# Patient Record
Sex: MALE | Race: WHITE | NOT HISPANIC OR LATINO | Employment: OTHER | URBAN - METROPOLITAN AREA
[De-identification: names, ages, dates, MRNs, and addresses within clinical notes are randomized per-mention and may not be internally consistent; named-entity substitution may affect disease eponyms.]

---

## 2017-04-21 ENCOUNTER — HISTORICAL (OUTPATIENT)
Dept: CARDIOLOGY | Facility: HOSPITAL | Age: 57
End: 2017-04-21

## 2017-05-16 ENCOUNTER — HISTORICAL (OUTPATIENT)
Dept: ADMINISTRATIVE | Facility: HOSPITAL | Age: 57
End: 2017-05-16

## 2019-09-27 ENCOUNTER — HISTORICAL (OUTPATIENT)
Dept: ADMINISTRATIVE | Facility: HOSPITAL | Age: 59
End: 2019-09-27

## 2019-12-02 ENCOUNTER — HISTORICAL (OUTPATIENT)
Dept: ADMINISTRATIVE | Facility: HOSPITAL | Age: 59
End: 2019-12-02

## 2020-02-11 ENCOUNTER — HISTORICAL (OUTPATIENT)
Dept: ADMINISTRATIVE | Facility: HOSPITAL | Age: 60
End: 2020-02-11

## 2020-05-26 ENCOUNTER — HISTORICAL (OUTPATIENT)
Dept: ADMINISTRATIVE | Facility: HOSPITAL | Age: 60
End: 2020-05-26

## 2020-12-01 ENCOUNTER — HISTORICAL (OUTPATIENT)
Dept: RADIOLOGY | Facility: HOSPITAL | Age: 60
End: 2020-12-01

## 2020-12-07 ENCOUNTER — HISTORICAL (OUTPATIENT)
Dept: ADMINISTRATIVE | Facility: HOSPITAL | Age: 60
End: 2020-12-07

## 2021-11-23 ENCOUNTER — HISTORICAL (OUTPATIENT)
Dept: ADMINISTRATIVE | Facility: HOSPITAL | Age: 61
End: 2021-11-23

## 2022-03-17 ENCOUNTER — HISTORICAL (OUTPATIENT)
Dept: ADMINISTRATIVE | Facility: HOSPITAL | Age: 62
End: 2022-03-17

## 2022-03-17 LAB
ANION GAP SERPL CALC-SCNC: 17 MMOL/L
BUN SERPL-MCNC: 26 MG/DL (ref 8–26)
CHLORIDE SERPL-SCNC: 103 MMOL/L (ref 98–109)
CREAT SERPL-MCNC: 1.1 MG/DL (ref 0.6–1.3)
GLUCOSE SERPL-MCNC: 96 MG/DL (ref 70–105)
HCT VFR BLD CALC: 48 % (ref 38–51)
HGB BLD-MCNC: 16.3 G/DL (ref 12–17)
POC IONIZED CALCIUM: 1.31 (ref 1.12–1.32)
POC TCO2: 28 (ref 24–29)
POTASSIUM BLD-SCNC: 4.4 MMOL/L (ref 3.5–4.9)
SODIUM BLD-SCNC: 142 MMOL/L (ref 138–146)

## 2022-03-28 ENCOUNTER — HISTORICAL (OUTPATIENT)
Dept: ADMINISTRATIVE | Facility: HOSPITAL | Age: 62
End: 2022-03-28

## 2022-04-10 ENCOUNTER — HISTORICAL (OUTPATIENT)
Dept: ADMINISTRATIVE | Facility: HOSPITAL | Age: 62
End: 2022-04-10

## 2022-04-25 ENCOUNTER — HISTORICAL (OUTPATIENT)
Dept: ADMINISTRATIVE | Facility: HOSPITAL | Age: 62
End: 2022-04-25

## 2022-04-29 VITALS
DIASTOLIC BLOOD PRESSURE: 71 MMHG | WEIGHT: 179.69 LBS | HEIGHT: 70 IN | BODY MASS INDEX: 25.73 KG/M2 | SYSTOLIC BLOOD PRESSURE: 114 MMHG

## 2022-05-19 DIAGNOSIS — M51.34 DEGENERATION OF THORACIC INTERVERTEBRAL DISC: Primary | ICD-10-CM

## 2022-05-25 ENCOUNTER — TELEPHONE (OUTPATIENT)
Dept: ADMINISTRATIVE | Facility: HOSPITAL | Age: 62
End: 2022-05-25
Payer: OTHER GOVERNMENT

## 2022-12-06 LAB — CRC RECOMMENDATION EXT: NORMAL

## 2022-12-21 ENCOUNTER — TELEPHONE (OUTPATIENT)
Dept: NEUROSURGERY | Facility: CLINIC | Age: 62
End: 2022-12-21
Payer: OTHER GOVERNMENT

## 2022-12-22 DIAGNOSIS — M54.9 MID BACK PAIN: ICD-10-CM

## 2022-12-22 DIAGNOSIS — M54.50 LUMBAR PAIN: Primary | ICD-10-CM

## 2022-12-22 NOTE — TELEPHONE ENCOUNTER
I spoke to the patient and he only wants to see Dr. Rivas.  I scheduled him for next available on 4/3/23 with thoracic and lumbar XR, but he is not happy about the wait.  He was treating with Dr. Decker (records are in media.)  At the last visit Dr. Pérez mentioned doing another epidural injection at the Thoracolumbar area, but it was denied with insurance.  The patient is stating that Dr. Pérez is saying he is experiencing protective muscle spasms or contractions at his T10-T12 level.  He is wanting to know if you know anything about this condition and if you can help him before he has to wait months to see you.  If not he would like a recommendation on who he could possibly see and he does not want to do more physical therapy, but it makes the pain worse.  I will call Dr. Farrell's office to get more records and send to Dr. Rivas for review.

## 2022-12-22 NOTE — TELEPHONE ENCOUNTER
Dr. Farrell's records and injection notes are in media.  Please let me know if you are familiar with protective muscle spasms or contractions and if you can help him or if you have a recommendation or a suggestion on someone else he should see.  More detailed message is below...BH

## 2023-01-10 DIAGNOSIS — M51.34 DEGENERATIVE DISC DISEASE, THORACIC: Primary | ICD-10-CM

## 2023-01-10 NOTE — TELEPHONE ENCOUNTER
I spoke to the patient.  He asked me to email Dr. Rivas's recommendation below and he would let me know how he would like to proceed.  I cancelled his appointment in April with Dr. Rivas...BH

## 2023-02-07 ENCOUNTER — TELEPHONE (OUTPATIENT)
Dept: NEUROSURGERY | Facility: CLINIC | Age: 63
End: 2023-02-07
Payer: OTHER GOVERNMENT

## 2023-02-08 DIAGNOSIS — M54.50 LUMBAR PAIN: ICD-10-CM

## 2023-02-08 DIAGNOSIS — M51.34 DEGENERATIVE DISC DISEASE, THORACIC: Primary | ICD-10-CM

## 2023-02-23 NOTE — PROGRESS NOTES
Subjective:      Patient ID: Jaxson Ferrell is a 62 y.o. male.    Chief Complaint: Back Pain (Referred by  Apply had lumbar fusion in 2019,  had injections past which did not get much relief would like to try another injection, treated with Dr Farrell, pain level 8/10)    Referred by: Ephraim Rivas MD     HPI:  Patient presents as a new consult for pain related to degenerative disc disease, thoracic and lumbar region.  On 03/24/2019 patient underwent removal of L5-S1 instrumentation with L4-5 and L5-S1 a TLIF with pedicle screws and post anterior lateral bone.  He had stabilozation with his symptoms after surgery.  He returned to Dr. Rivas at the end of 2021 due to increased back pain and notable right leg weakness.  Dr. Gar performed  original lumbar surgery. He currently takes Gabapentin 800 mg TID, Baclofen 10 mg BID     Primary pain located to right low thoracic  region of his back with sread on occasion to left side . States pain feels like a TENS unit when it contractes. Pain to this area wakes him up with spasms. He is unable to perform core exercises. Pain intense with walking, sweeping, mopping, sitting too long and standing too long. Pain raidates to bilateral low back, bilateral buttocks, bilateral thighs to lateral aspect to bilateral ankles and bilateral feet numb on bottom with intermittent numbness to toes. Pressing pain is located to posterior mid back (dermatome about 2 in below nipple line from anterior chest  as reference).     Review of Thoracic MRI notes Degenerative changes:Small multilevel disc bulges.No significant canal narrowing.Moderate right foraminal narrowing at T11-12, mild on the left at T2-3 and from T10 through T12 related to facet hypertrophy.IMPRESSION: shows no significant canal stenosis. Vary degrees of neuro foraminal stenosis as detailed, moderate on the right at T11-T12. No disc herniations     Pain score 8/10. Daily pain 7/10-10/10. Feels like sharp stabbing  pain. He also has weakness to bilateral legs.     PMH:  Lumbar radiculitis, Lumbar spondylosis,Spinal stenosis, osteoarthritis, kidney stones, lumbar neuritis/radiculitis, lumbar spondylosis, failed back syndrome lumbar region, cervical spondylosis without myelopathy, pelvic/thigh/hip degenerative joint disease, shoulder DJD, lumbar disc degeneration, cervical disc degeneration, patellar subluxation, chronic bilateral knee pain, ulnar neuropathy with degeneration of nerve, osteoporosis, arthrosis after fusion, bilateral lower extremity edema, spondylolisthesis L4-L5 , disc degeneration thoracic region, disc degeneration lumbar region, bilateral carpal tunnel syndrome, lumbar stenosis with neurogenic claudication, low back pain, cervical pain, cervical spondylosis with radiculopathy, vascular disease with venous insufficiency, arthritis, hypercholesterolemia, kidney stones,    PSH:  Fusion of spine, (prior fusion L4-L5 and L5-S1)  kidney stone removal, sinus surgery Prevous surgery March 2010 L5/S1 that failed.   FH: cancer: Mother and father  Social history:  Prior smoker.  Drinks alcohol.  Negative illicit drug use.  Negative heavy alcohol use.    Medications from 4/2022:  Atorvastatin calcium 40 mg daily  Baclofen 10 mg t.i.d.   Co Q10 200 mg daily   Flomax 0.4 mg daily   Gabapentin 800 mg b.i.d./t.i.d.  Vitamin D3 125 mcg area of the day  Claritin D 12 hour 5/120 mg tablet ER   Lorazepam 1 mg tablet as needed  Prolia 60 mg/mL subcu every 6 months   Tylenol 8 hour Arthritis pain 650 mg tablet b.i.d.  BU metolazone 750 mg tablet twice daily  Ciprofloxacin 500 mg tablet    Prior Interventional Pain Procedures:   10/2022: Lumbar ESIs   Lumbar MBBs stopped as no relief      MRI Thoracic Spine 2022     DISCUSSION:     Alignment: Slight exaggeration of the normal kyphosis. Gentle S-shaped  curvature with right apex at T6 and left apex at T11.  Soft tissues: No signal abnormalities  Spinal cord: Normal in signal and  morphology. Ends at T12-L1.n     Vertebrae:   No fractures, infection or neoplasm.  Right posterior T7 body 1.6 cm atypical (lipid poor) hemangioma.  Endplate marrow edema centered at T11-12 on the right is presumed  degenerative.     Degenerative changes:  Small multilevel disc bulges.  No significant canal narrowing.  Moderate right foraminal narrowing at T11-12, mild on the left at T2-3  and from T10 through T12 related to facet hypertrophy.     IMPRESSION:      1. No significant canal stenosis.     2. Varying degrees of neuroforaminal stenoses as detailed, moderate on  the right at T11-12.     3. No disc herniations.       MRI lumbar spine 03/28/2022:    Alignment:  Normal, lordosis.  No scoliosis.  Grade 1 anterior listhesis of L4 on L5.    Soft tissues:  Postsurgical changes dorsally.  Otherwise no signal abnormalities.  Posterior paraspinal muscles: Fatty atrophy.  Intramuscular edema on the left at the lumbosacral junction suggest injury/strain.  Otherwise no signal abnormalities.    Conus medullaris:  Normal ends at L1   Cauda equina:  No masses or arachnoiditis.      Vertebrae:  No fractures, infection or neoplasm.  Status post posterior fusion and decompression from L4 through S1 with bilateral stabilizing rods, trans pedicular screws, L4-5 interbody fusion device, L5-S1 disc spacer, left L4 hemilaminectomy and L5-S1 decompressive laminectomies.    Degenerative changes:     L1-L2 facet hypertrophy.  No significant canal or foraminal narrowing.      L2-L3:  Facet hypertrophy.  No significant canal or foraminal narrowing.      L3-L4: Mild canal and bilateral foraminal narrowing related to symmetric disc bulge and facet hypertrophy.      L4-L5: Postsurgical changes.    No significant canal or foraminal narrowing.      L5-S1:  Postsurgical changes   No significant canal or foraminal narrowing.      Incidental findings: Right renal lower pole nonobstructive stone.    Small bilateral renal cysts.       IMPRESSION:   Postsurgical spine   Mild degenerative canal and bilateral neuroforaminal stenosis at L3-4.    No disc herniation  The study does not show cord compression .  Facet hypertrophy at T11-12, T12-L1.  No foraminal stenosis at L4-5 or L5-S1    Cervical MRI spine:  2022:  Cervical spondylosis with stenosis at C3-4 greater than C4-5.  This is progressed as compared to cervical MRI from 2018      Interventional Pain History  08/16/2022:  TFESI lumbar/sacroiliac single level x2  10/2022: Bilateral lumbar medial branch blocks (di not work)       ROS low back pain    Pertinent labs 2019 (outdated):   Hematocrit low 28.7   Hemoglobin low 9.1  platelets low 118                   Objective:          Physical Exam  General: Well developed; overweight; A&O x 3; No anxiety/depression; NAD  Mental Status: Oriented to person, palce and time. Displays appropriate mood & affect.  Head: Norm cephalic and atraumatic  Neck: Midline trachea  Eyes: normal conjunctiva, normal lids, normal pupils  ENT and mouth: normal external ear, nose, and no lesions noted on the lips.  Respiratory: Symmetrical, Unlabored. No dyspnea  CV: normal  S1/S2, normal rhythm and rate. No peripheral edema.   Abdomen: Non-distended    Extremities:  Gen: No cyanosis or tenderness to palpation bilateral upper and lower extremities  Skin: Warm, pink, dry, no rashes, no lesions on the lumbar spine  Strength: 4/5 motor strength bilateral upper and lower  ROM: Full ROM in bilateral knees and ankles without pain or instability.    Neuro:  Gait: Guarded gait. Indendant ambulator. no altalgic lean, normal toe and heel raise  DTR's: 2+ in bilateral patellar, and ankle  Sensory:  bilteral arm and hand numbness R>L  Bilateal feet numb.   Thoric and Lumbar Spine: Normal lordosis. No scoliosis  L-spine and T spine ROM: Painful with , extension, bilateral rotation, Normal flexion  Straight Leg Raise: + bilaterally (guarded)  SI Joint: + tenderness bilateral               Assessment:       Encounter Diagnoses   Name Primary?    Degenerative disc disease, thoracic Yes    Lumbar pain     Lumbar spondylosis     Bulge of lumbar disc without myelopathy     Bulge of thoracic disc without myelopathy          Plan:       Jaxson was seen today for back pain.    Diagnoses and all orders for this visit:    Degenerative disc disease, thoracic  -     Ambulatory referral/consult to Pain Clinic    Lumbar pain  -     Ambulatory referral/consult to Pain Clinic    Lumbar spondylosis    Bulge of lumbar disc without myelopathy    Bulge of thoracic disc without myelopathy     Request sent to Dr. Ly for current CMP, CBC with diff labs  I am going to request review of MRI T spine with Dr. Buckley for her opinion regarding dermatome  level and confirmation of disc bulges in imaging.   Prior schedule T11/12  recommendation per Dr Dash.   Confirm if this T spine pain is from facet joints vs disc bulge with Dr. Buckley.     I will call patient back with plan of care once I receive feedback from Dr. Buckley.     Dr. Buckley will see him in a follow up visit and discuss his plan of care. He is agreeable with this plan.         Past Medical History:   Diagnosis Date    Arthritis     High cholesterol     Kidney stones     Osteoporosis     Personal history of colonic polyps     Spinal stenosis     thoracic    Urinary retention        Past Surgical History:   Procedure Laterality Date    COLONOSCOPY N/A 12/06/2022    [facility]arg[facility]se Padilla    CYSTOSCOPY W/ LASER LITHOTRIPSY      EPIDURAL STEROID INJECTION INTO THORACIC SPINE N/A 03/20/2023    Procedure: INJECTION, STEROID, SPINE, THORACIC, EPIDURAL T10-11 interlaminar;  Surgeon: Autumn Buckley MD;  Location: [facility] OR;  Service: Pain Management;  Laterality: N/A;  T10-11 interlaminar    spinal injections      SPINE SURGERY      fusion 2 levels with rods and screws    TONSILLECTOMY      turbinate reduction         Family History   Problem Relation Age  of Onset    Breast cancer Mother     Coronary artery disease Father     Colon cancer Father     Prostate cancer Father        Social History     Socioeconomic History    Marital status: Single   Tobacco Use    Smoking status: Former     Types: Cigarettes     Quit date: 3/2/2010     Years since quittin.2    Smokeless tobacco: Never   Substance and Sexual Activity    Alcohol use: Yes     Alcohol/week: 1.0 standard drink     Types: 1 Glasses of wine per week     Comment: WINE IN EVENING    Drug use: Never    Sexual activity: Yes     Partners: Female     Social Determinants of Health     Financial Resource Strain: Low Risk     Difficulty of Paying Living Expenses: Not hard at all   Food Insecurity: No Food Insecurity    Worried About Running Out of Food in the Last Year: Never true    Ran Out of Food in the Last Year: Never true   Transportation Needs: No Transportation Needs    Lack of Transportation (Medical): No    Lack of Transportation (Non-Medical): No   Physical Activity: Sufficiently Active    Days of Exercise per Week: 5 days    Minutes of Exercise per Session: 40 min   Stress: No Stress Concern Present    Feeling of Stress : Not at all   Social Connections: Unknown    Frequency of Communication with Friends and Family: More than three times a week    Frequency of Social Gatherings with Friends and Family: More than three times a week    Active Member of Clubs or Organizations: Yes    Attends Club or Organization Meetings: More than 4 times per year    Marital Status:    Housing Stability: Unknown    Unable to Pay for Housing in the Last Year: No    Unstable Housing in the Last Year: No       Current Outpatient Medications   Medication Sig Dispense Refill    acetaminophen (TYLENOL) 650 MG TbSR Take 650 mg by mouth 2 (two) times daily.      atorvastatin (LIPITOR) 40 MG tablet Take 40 mg by mouth.      baclofen (LIORESAL) 10 MG tablet Take 10 mg by mouth 2 (two) times daily.      co-enzyme Q-10 30 mg  capsule Take 30 mg by mouth 3 (three) times daily.      gabapentin (NEURONTIN) 800 MG tablet Take 800 mg by mouth 3 (three) times daily.      PROLIA 60 mg/mL Syrg Inject into the skin.      tamsulosin (FLOMAX) 0.4 mg Cap Take 1 capsule by mouth.      loratadine-pseudoephedrine  mg (CLARITIN-D 24-HOUR)  mg per 24 hr tablet Take 1 tablet by mouth once daily.      nabumetone (RELAFEN) 500 MG tablet Take 750 mg by mouth 2 (two) times daily as needed for Pain.      vitamin D (VITAMIN D3) 1000 units Tab Take 5,000 Units by mouth 3 (three) times a week.       No current facility-administered medications for this visit.       Review of patient's allergies indicates:  No Known Allergies

## 2023-02-24 ENCOUNTER — OFFICE VISIT (OUTPATIENT)
Dept: PAIN MEDICINE | Facility: CLINIC | Age: 63
End: 2023-02-24
Payer: OTHER GOVERNMENT

## 2023-02-24 VITALS
DIASTOLIC BLOOD PRESSURE: 83 MMHG | HEART RATE: 80 BPM | WEIGHT: 189 LBS | SYSTOLIC BLOOD PRESSURE: 121 MMHG | BODY MASS INDEX: 27.06 KG/M2 | TEMPERATURE: 99 F | HEIGHT: 70 IN

## 2023-02-24 DIAGNOSIS — M51.34 DEGENERATIVE DISC DISEASE, THORACIC: Primary | ICD-10-CM

## 2023-02-24 DIAGNOSIS — M54.50 LUMBAR PAIN: ICD-10-CM

## 2023-02-24 DIAGNOSIS — M51.36 BULGE OF LUMBAR DISC WITHOUT MYELOPATHY: ICD-10-CM

## 2023-02-24 DIAGNOSIS — M47.816 LUMBAR SPONDYLOSIS: ICD-10-CM

## 2023-02-24 DIAGNOSIS — M51.34 BULGE OF THORACIC DISC WITHOUT MYELOPATHY: ICD-10-CM

## 2023-02-24 PROCEDURE — 99205 OFFICE O/P NEW HI 60 MIN: CPT | Mod: ,,, | Performed by: NURSE PRACTITIONER

## 2023-02-24 PROCEDURE — 99205 PR OFFICE/OUTPT VISIT, NEW, LEVL V, 60-74 MIN: ICD-10-PCS | Mod: ,,, | Performed by: NURSE PRACTITIONER

## 2023-02-24 RX ORDER — FERROUS SULFATE, DRIED 160(50) MG
1 TABLET, EXTENDED RELEASE ORAL 2 TIMES DAILY WITH MEALS
COMMUNITY
End: 2023-03-06 | Stop reason: ALTCHOICE

## 2023-02-24 RX ORDER — UBIDECARENONE 30 MG
30 CAPSULE ORAL DAILY
COMMUNITY

## 2023-02-24 RX ORDER — TAMSULOSIN HYDROCHLORIDE 0.4 MG/1
1 CAPSULE ORAL DAILY
COMMUNITY
Start: 2023-01-29

## 2023-02-24 RX ORDER — DENOSUMAB 60 MG/ML
60 INJECTION SUBCUTANEOUS
COMMUNITY
Start: 2022-12-26

## 2023-02-24 RX ORDER — GABAPENTIN 800 MG/1
800 TABLET ORAL 3 TIMES DAILY
COMMUNITY

## 2023-02-24 RX ORDER — CHROMIUM PICOLINATE 200 MCG
5000 TABLET ORAL
COMMUNITY
End: 2023-03-08

## 2023-02-24 RX ORDER — BACLOFEN 10 MG/1
10 TABLET ORAL 2 TIMES DAILY
COMMUNITY

## 2023-02-24 RX ORDER — DEXTROMETHORPHAN HYDROBROMIDE, GUAIFENESIN 5; 100 MG/5ML; MG/5ML
650 LIQUID ORAL 2 TIMES DAILY
COMMUNITY

## 2023-02-24 RX ORDER — ATORVASTATIN CALCIUM 40 MG/1
40 TABLET, FILM COATED ORAL DAILY
COMMUNITY
Start: 2023-02-12

## 2023-02-24 RX ORDER — PHENAZOPYRIDINE HYDROCHLORIDE 200 MG/1
200 TABLET, FILM COATED ORAL EVERY 8 HOURS PRN
COMMUNITY
Start: 2022-11-28 | End: 2023-03-08

## 2023-02-24 NOTE — Clinical Note
Velma, before I reach out to Dr. Buckley to review patient's MRI thoracic spine with me and confirm his plan of care, please update if patient brought imaging of his MRI thoracic spine as I do not see anything that has been scanned in .  Thanks once I received this then I will review this with Dr. Buckley then [place] back with patient.Thanks, Maricarmen

## 2023-03-02 ENCOUNTER — OFFICE VISIT (OUTPATIENT)
Dept: PAIN MEDICINE | Facility: CLINIC | Age: 63
End: 2023-03-02
Payer: OTHER GOVERNMENT

## 2023-03-02 VITALS
BODY MASS INDEX: 27.06 KG/M2 | DIASTOLIC BLOOD PRESSURE: 87 MMHG | SYSTOLIC BLOOD PRESSURE: 121 MMHG | HEIGHT: 70 IN | WEIGHT: 189 LBS | TEMPERATURE: 99 F | HEART RATE: 66 BPM

## 2023-03-02 DIAGNOSIS — M51.34 THORACIC DEGENERATIVE DISC DISEASE: ICD-10-CM

## 2023-03-02 DIAGNOSIS — M54.9 MID BACK PAIN: Primary | ICD-10-CM

## 2023-03-02 PROCEDURE — 99214 PR OFFICE/OUTPT VISIT, EST, LEVL IV, 30-39 MIN: ICD-10-PCS | Mod: ,,, | Performed by: ANESTHESIOLOGY

## 2023-03-02 PROCEDURE — 99214 OFFICE O/P EST MOD 30 MIN: CPT | Mod: ,,, | Performed by: ANESTHESIOLOGY

## 2023-03-02 NOTE — PROGRESS NOTES
Autumn Buckley MD        PATIENT NAME: Jaxson Ferrell  : 1960  DATE: 3/2/23  MRN: [id number]      Billing Provider: Autumn Buckley MD  Level of Service:   Patient PCP Information       Provider PCP Type    Anshul East MD General            Reason for Visit / Chief Complaint: Back Pain (F/u for back pain, MRI uploaded in EMR, pt states nothing has changed since his last appointment, tylenol, and prescription medications for relief, pain level 8/10)       Update PCP  Update Chief Complaint         History of Present Illness / Problem Focused Workflow     Jaxson Ferrell presents to the clinic with Back Pain (F/u for back pain, MRI uploaded in EMR, pt states nothing has changed since his last appointment, tylenol, and prescription medications for relief, pain level 8/10)     Patient presents for f/u of degenerative disc disease, thoracic and lumbar .  On 2019 patient underwent removal of L5-S1 instrumentation with L4-5 and L5-S1 a TLIF with pedicle screws and post anterior lateral bone.  He had stabilization with his symptoms after surgery.  He returned to Dr. Rivas at the end of  due to increased back pain and notable right leg weakness.  Dr. Gar performed  original lumbar surgery.  He currently takes Gabapentin 800 mg TID, Baclofen 10 mg BID      Primary pain located to right low thoracic with spread on occasion to left side . States pain feels like a TENS unit when it contracts. Pain to this area wakes him up with spasms. He is unable to perform core exercises. Pain intense with walking, sweeping, mopping, sitting too long and standing too long.   Pain raidates to bilateral low back, bilateral buttocks, bilateral thighs to lateral aspect to bilateral ankles and bilateral feet numb on bottom with intermittent numbness to toes.   Pain score 8/10. Daily pain 7/10-10/10. Feels like sharp stabbing pain. He also has weakness to bilateral legs.      Pressing pain is located to  posterior mid back (dermatome about 2 in below nipple line from anterior chest  as reference)      Back Pain  Associated symptoms include leg pain and numbness.     Review of Systems     Review of Systems   Musculoskeletal:  Positive for back pain and leg pain.   Neurological:  Positive for numbness.   All other systems reviewed and are negative.     Medical / Social / Family History     Past Medical History:   Diagnosis Date    Bilateral kidney stones     High cholesterol        Past Surgical History:   Procedure Laterality Date    COLONOSCOPY N/A     SPINE SURGERY      TONSILLECTOMY         Social History  Mr. Ferrell  reports that he has never smoked. He has never used smokeless tobacco. He reports current alcohol use of about 1.0 standard drink per week. He reports that he does not use drugs.    Family History  Mr.'s Ferrell family history is not on file.    Medications and Allergies     Medications  No outpatient medications have been marked as taking for the 3/2/23 encounter (Office Visit) with Autumn Buckley MD.       Allergies  Review of patient's allergies indicates:  No Known Allergies    Physical Examination     Vitals:    03/02/23 0935   BP: 121/87   Pulse: 66   Temp: 98.6 °F (37 °C)     Spine Musculoskeletal Exam    Gait    Gait is normal.    Inspection    Thoracolumbar        Prior incision: lateral lumbar    Incision: clean, dry, intact and well-healed    Incisional drainage: none    Palpation    Thoracolumbar    Tenderness: present      Paraspinous: right    Right      Masses: none      Spasms: none      Crepitus: none      Muscle tone: increased    Left      Masses: none      Spasms: none      Crepitus: none      Muscle tone: normal    Strength    Thoracolumbar    Thoracolumbar motor exam is normal.       Sensory    Thoracolumbar    Thoracolumbar sensation is normal.    General      Constitutional: appears stated age, well-developed and well-nourished    Scleral icterus: no    Labored breathing:  no    Psychiatric: normal mood and affect and no acute distress    Neurological: alert and oriented x3    Skin: intact    Lymphadenopathy: none     Assessment and Plan (including Health Maintenance)      Problem List  Smart Sets  Document Outside HM   :    Plan:   Mid back pain    Thoracic degenerative disc disease       I am scheduling the patient for a T10-11 interlaminar thoracic epidural steroid injection for his midback pain associated with findings of disc degeneration on his MRI at that level.  The plan was discussed with the patient and he wishes to proceed.    Problem List Items Addressed This Visit    None  Visit Diagnoses       Mid back pain    -  Primary    Thoracic degenerative disc disease                  Future Appointments   Date Time Provider Department Center   4/18/2023 10:20 AM Anshul Ly MD [place] [id number]        There are no Patient Instructions on file for this visit.  No follow-ups on file.     Signature:  Autumn Buckley MD      Date of encounter: 3/2/23

## 2023-03-02 NOTE — H&P (VIEW-ONLY)
Autumn Buckley MD        PATIENT NAME: Jaxson Ferrell  : 1960  DATE: 3/2/23  MRN: [id number]      Billing Provider: Autumn Buckley MD  Level of Service:   Patient PCP Information       Provider PCP Type    Anshul East MD General            Reason for Visit / Chief Complaint: Back Pain (F/u for back pain, MRI uploaded in EMR, pt states nothing has changed since his last appointment, tylenol, and prescription medications for relief, pain level 8/10)       Update PCP  Update Chief Complaint         History of Present Illness / Problem Focused Workflow     Jaxson Ferrell presents to the clinic with Back Pain (F/u for back pain, MRI uploaded in EMR, pt states nothing has changed since his last appointment, tylenol, and prescription medications for relief, pain level 8/10)     Patient presents for f/u of degenerative disc disease, thoracic and lumbar .  On 2019 patient underwent removal of L5-S1 instrumentation with L4-5 and L5-S1 a TLIF with pedicle screws and post anterior lateral bone.  He had stabilization with his symptoms after surgery.  He returned to Dr. Rivas at the end of  due to increased back pain and notable right leg weakness.  Dr. Gar performed  original lumbar surgery.  He currently takes Gabapentin 800 mg TID, Baclofen 10 mg BID      Primary pain located to right low thoracic with spread on occasion to left side . States pain feels like a TENS unit when it contracts. Pain to this area wakes him up with spasms. He is unable to perform core exercises. Pain intense with walking, sweeping, mopping, sitting too long and standing too long.   Pain raidates to bilateral low back, bilateral buttocks, bilateral thighs to lateral aspect to bilateral ankles and bilateral feet numb on bottom with intermittent numbness to toes.   Pain score 8/10. Daily pain 7/10-10/10. Feels like sharp stabbing pain. He also has weakness to bilateral legs.      Pressing pain is located to  posterior mid back (dermatome about 2 in below nipple line from anterior chest  as reference)      Back Pain  Associated symptoms include leg pain and numbness.     Review of Systems     Review of Systems   Musculoskeletal:  Positive for back pain and leg pain.   Neurological:  Positive for numbness.   All other systems reviewed and are negative.     Medical / Social / Family History     Past Medical History:   Diagnosis Date    Bilateral kidney stones     High cholesterol        Past Surgical History:   Procedure Laterality Date    COLONOSCOPY N/A     SPINE SURGERY      TONSILLECTOMY         Social History  Mr. Ferrell  reports that he has never smoked. He has never used smokeless tobacco. He reports current alcohol use of about 1.0 standard drink per week. He reports that he does not use drugs.    Family History  Mr.'s Ferrell family history is not on file.    Medications and Allergies     Medications  No outpatient medications have been marked as taking for the 3/2/23 encounter (Office Visit) with Autumn Buckley MD.       Allergies  Review of patient's allergies indicates:  No Known Allergies    Physical Examination     Vitals:    03/02/23 0935   BP: 121/87   Pulse: 66   Temp: 98.6 °F (37 °C)     Spine Musculoskeletal Exam    Gait    Gait is normal.    Inspection    Thoracolumbar        Prior incision: lateral lumbar    Incision: clean, dry, intact and well-healed    Incisional drainage: none    Palpation    Thoracolumbar    Tenderness: present      Paraspinous: right    Right      Masses: none      Spasms: none      Crepitus: none      Muscle tone: increased    Left      Masses: none      Spasms: none      Crepitus: none      Muscle tone: normal    Strength    Thoracolumbar    Thoracolumbar motor exam is normal.       Sensory    Thoracolumbar    Thoracolumbar sensation is normal.    General      Constitutional: appears stated age, well-developed and well-nourished    Scleral icterus: no    Labored breathing:  no    Psychiatric: normal mood and affect and no acute distress    Neurological: alert and oriented x3    Skin: intact    Lymphadenopathy: none     Assessment and Plan (including Health Maintenance)      Problem List  Smart Sets  Document Outside HM   :    Plan:   Mid back pain    Thoracic degenerative disc disease       I am scheduling the patient for a T10-11 interlaminar thoracic epidural steroid injection for his midback pain associated with findings of disc degeneration on his MRI at that level.  The plan was discussed with the patient and he wishes to proceed.    Problem List Items Addressed This Visit    None  Visit Diagnoses       Mid back pain    -  Primary    Thoracic degenerative disc disease                  Future Appointments   Date Time Provider Department Center   4/18/2023 10:20 AM Anshul Ly MD [place] [id number]        There are no Patient Instructions on file for this visit.  No follow-ups on file.     Signature:  Autumn Buckley MD      Date of encounter: 3/2/23

## 2023-03-08 RX ORDER — NABUMETONE 500 MG/1
750 TABLET, FILM COATED ORAL 2 TIMES DAILY PRN
COMMUNITY

## 2023-03-08 RX ORDER — CHOLECALCIFEROL (VITAMIN D3) 25 MCG
5000 TABLET ORAL
COMMUNITY

## 2023-03-20 ENCOUNTER — HOSPITAL ENCOUNTER (OUTPATIENT)
Facility: HOSPITAL | Age: 63
Discharge: HOME | End: 2023-03-20
Attending: ANESTHESIOLOGY | Admitting: ANESTHESIOLOGY
Payer: OTHER GOVERNMENT

## 2023-03-20 ENCOUNTER — ANESTHESIA (OUTPATIENT)
Dept: SURGERY | Facility: HOSPITAL | Age: 63
End: 2023-03-20
Payer: OTHER GOVERNMENT

## 2023-03-20 ENCOUNTER — ANESTHESIA EVENT (OUTPATIENT)
Dept: SURGERY | Facility: HOSPITAL | Age: 63
End: 2023-03-20
Payer: OTHER GOVERNMENT

## 2023-03-20 DIAGNOSIS — G89.29 CHRONIC BACK PAIN GREATER THAN 3 MONTHS DURATION: ICD-10-CM

## 2023-03-20 DIAGNOSIS — M54.9 CHRONIC BACK PAIN GREATER THAN 3 MONTHS DURATION: ICD-10-CM

## 2023-03-20 PROCEDURE — 62321 NJX INTERLAMINAR CRV/THRC: CPT | Performed by: ANESTHESIOLOGY

## 2023-03-20 PROCEDURE — 25000003 PHARM REV CODE 250

## 2023-03-20 PROCEDURE — A4216 STERILE WATER/SALINE, 10 ML: HCPCS | Performed by: ANESTHESIOLOGY

## 2023-03-20 PROCEDURE — 63600175 PHARM REV CODE 636 W HCPCS

## 2023-03-20 PROCEDURE — 62321 NJX INTERLAMINAR CRV/THRC: CPT | Mod: ,,, | Performed by: ANESTHESIOLOGY

## 2023-03-20 PROCEDURE — 62321 PR INJ CERV/THORAC, W/GUIDANCE: ICD-10-PCS | Mod: ,,, | Performed by: ANESTHESIOLOGY

## 2023-03-20 PROCEDURE — 63600175 PHARM REV CODE 636 W HCPCS: Performed by: ANESTHESIOLOGY

## 2023-03-20 PROCEDURE — 37000008 HC ANESTHESIA 1ST 15 MINUTES: Performed by: ANESTHESIOLOGY

## 2023-03-20 PROCEDURE — 25000003 PHARM REV CODE 250: Performed by: ANESTHESIOLOGY

## 2023-03-20 RX ORDER — LIDOCAINE HYDROCHLORIDE 10 MG/ML
INJECTION, SOLUTION EPIDURAL; INFILTRATION; INTRACAUDAL; PERINEURAL
Status: DISCONTINUED | OUTPATIENT
Start: 2023-03-20 | End: 2023-03-20

## 2023-03-20 RX ORDER — HYDROCODONE BITARTRATE AND ACETAMINOPHEN 5; 325 MG/1; MG/1
1 TABLET ORAL
Status: CANCELLED | OUTPATIENT
Start: 2023-03-20

## 2023-03-20 RX ORDER — HYDROMORPHONE HYDROCHLORIDE 2 MG/ML
0.2 INJECTION, SOLUTION INTRAMUSCULAR; INTRAVENOUS; SUBCUTANEOUS EVERY 5 MIN PRN
Status: CANCELLED | OUTPATIENT
Start: 2023-03-20

## 2023-03-20 RX ORDER — LIDOCAINE HYDROCHLORIDE 10 MG/ML
INJECTION, SOLUTION EPIDURAL; INFILTRATION; INTRACAUDAL; PERINEURAL
Status: DISCONTINUED | OUTPATIENT
Start: 2023-03-20 | End: 2023-03-20 | Stop reason: HOSPADM

## 2023-03-20 RX ORDER — LIDOCAINE HYDROCHLORIDE 10 MG/ML
INJECTION, SOLUTION EPIDURAL; INFILTRATION; INTRACAUDAL; PERINEURAL
Status: DISCONTINUED
Start: 2023-03-20 | End: 2023-03-20 | Stop reason: HOSPADM

## 2023-03-20 RX ORDER — IPRATROPIUM BROMIDE AND ALBUTEROL SULFATE 2.5; .5 MG/3ML; MG/3ML
3 SOLUTION RESPIRATORY (INHALATION) ONCE AS NEEDED
Status: CANCELLED | OUTPATIENT
Start: 2023-03-20 | End: 2034-08-15

## 2023-03-20 RX ORDER — DIPHENHYDRAMINE HYDROCHLORIDE 50 MG/ML
25 INJECTION INTRAMUSCULAR; INTRAVENOUS EVERY 6 HOURS PRN
Status: CANCELLED | OUTPATIENT
Start: 2023-03-20

## 2023-03-20 RX ORDER — SODIUM CHLORIDE 9 MG/ML
INJECTION, SOLUTION INTRAVENOUS CONTINUOUS
Status: DISCONTINUED | OUTPATIENT
Start: 2023-03-20 | End: 2023-03-20 | Stop reason: HOSPADM

## 2023-03-20 RX ORDER — MEPERIDINE HYDROCHLORIDE 25 MG/ML
12.5 INJECTION INTRAMUSCULAR; INTRAVENOUS; SUBCUTANEOUS ONCE
Status: CANCELLED | OUTPATIENT
Start: 2023-03-20 | End: 2023-03-20

## 2023-03-20 RX ORDER — SODIUM CHLORIDE 9 MG/ML
INJECTION, SOLUTION INTRAMUSCULAR; INTRAVENOUS; SUBCUTANEOUS
Status: DISCONTINUED | OUTPATIENT
Start: 2023-03-20 | End: 2023-03-20 | Stop reason: HOSPADM

## 2023-03-20 RX ORDER — PROPOFOL 10 MG/ML
VIAL (ML) INTRAVENOUS
Status: DISCONTINUED | OUTPATIENT
Start: 2023-03-20 | End: 2023-03-20

## 2023-03-20 RX ORDER — DEXAMETHASONE SODIUM PHOSPHATE 10 MG/ML
INJECTION INTRAMUSCULAR; INTRAVENOUS
Status: DISCONTINUED | OUTPATIENT
Start: 2023-03-20 | End: 2023-03-20 | Stop reason: HOSPADM

## 2023-03-20 RX ORDER — METOCLOPRAMIDE HYDROCHLORIDE 5 MG/ML
10 INJECTION INTRAMUSCULAR; INTRAVENOUS EVERY 10 MIN PRN
Status: CANCELLED | OUTPATIENT
Start: 2023-03-20

## 2023-03-20 RX ORDER — BUPIVACAINE HYDROCHLORIDE 2.5 MG/ML
INJECTION, SOLUTION EPIDURAL; INFILTRATION; INTRACAUDAL
Status: DISCONTINUED | OUTPATIENT
Start: 2023-03-20 | End: 2023-03-20 | Stop reason: HOSPADM

## 2023-03-20 RX ORDER — DEXAMETHASONE SODIUM PHOSPHATE 10 MG/ML
INJECTION INTRAMUSCULAR; INTRAVENOUS
Status: DISCONTINUED
Start: 2023-03-20 | End: 2023-03-20 | Stop reason: HOSPADM

## 2023-03-20 RX ORDER — MIDAZOLAM HYDROCHLORIDE 1 MG/ML
2 INJECTION INTRAMUSCULAR; INTRAVENOUS ONCE AS NEEDED
Status: CANCELLED | OUTPATIENT
Start: 2023-03-20 | End: 2034-08-15

## 2023-03-20 RX ORDER — ONDANSETRON 2 MG/ML
4 INJECTION INTRAMUSCULAR; INTRAVENOUS ONCE
Status: CANCELLED | OUTPATIENT
Start: 2023-03-20 | End: 2023-03-20

## 2023-03-20 RX ORDER — LIDOCAINE HYDROCHLORIDE 10 MG/ML
1 INJECTION, SOLUTION EPIDURAL; INFILTRATION; INTRACAUDAL; PERINEURAL ONCE
Status: DISCONTINUED | OUTPATIENT
Start: 2023-03-20 | End: 2023-03-20 | Stop reason: HOSPADM

## 2023-03-20 RX ORDER — SODIUM CHLORIDE 9 MG/ML
INJECTION, SOLUTION INTRAMUSCULAR; INTRAVENOUS; SUBCUTANEOUS
Status: DISCONTINUED
Start: 2023-03-20 | End: 2023-03-20 | Stop reason: HOSPADM

## 2023-03-20 RX ORDER — LIDOCAINE HYDROCHLORIDE 10 MG/ML
1 INJECTION, SOLUTION EPIDURAL; INFILTRATION; INTRACAUDAL; PERINEURAL ONCE AS NEEDED
Status: DISCONTINUED | OUTPATIENT
Start: 2023-03-20 | End: 2023-03-20 | Stop reason: HOSPADM

## 2023-03-20 RX ADMIN — PROPOFOL 50 MG: 10 INJECTION, EMULSION INTRAVENOUS at 09:03

## 2023-03-20 RX ADMIN — PROPOFOL 20 MG: 10 INJECTION, EMULSION INTRAVENOUS at 09:03

## 2023-03-20 RX ADMIN — LIDOCAINE HYDROCHLORIDE 25 MG: 10 INJECTION, SOLUTION EPIDURAL; INFILTRATION; INTRACAUDAL; PERINEURAL at 09:03

## 2023-03-20 NOTE — DISCHARGE SUMMARY
[facility] Surgical - Periop Services  Discharge Note  Short Stay    Procedure(s) (LRB):  INJECTION, STEROID, SPINE, THORACIC, EPIDURAL T10-11 interlaminar (N/A)      OUTCOME: Patient tolerated treatment/procedure well without complication and is now ready for discharge.    DISPOSITION: Home or Self Care    FINAL DIAGNOSIS:  <principal problem not specified>    FOLLOWUP: In clinic    DISCHARGE INSTRUCTIONS:  No discharge procedures on file.     TIME SPENT ON DISCHARGE: 5 minutes

## 2023-03-20 NOTE — INTERVAL H&P NOTE
The patient has been examined and the H&P has been reviewed:    I concur with the findings and no changes have occurred since H&P was written.    Procedure risks, benefits and alternative options discussed and understood by patient/family.      CV ext warm well perfused  Resp non labored    There are no hospital problems to display for this patient.

## 2023-03-20 NOTE — OP NOTE
Procedure:  T10-11 interlaminar epidural steroid injection    Pre-Procedure Diagnoses:  Chronic back pain greater than 3 months  Thoracic degenerative disc disease  Thoracic spondylosis  Thoracic disc displacement    Post-Procedure Diagnoses:  Chronic back pain greater than 3 months  Thoracic degenerative disc disease  Thoracic spondylosis  Thoracic disc displacement    Anesthesia:  Local and MAC    Estimated Blood Loss:  < 2 ML    Consent:  The procedure, risks, benefits, and alternatives were discussed with the patient.  The patient voiced understanding and fully informed written consent was obtained.    Description of the Procedure:  The patient was taken to the operating room and placed in the prone position. The skin overlying the thoracic spine was prepped with Chloraprep and draped in the usual sterile fashion.  Fluoroscopy was used to visualize the T10-11 interspace.  Skin anesthesia was achieved using 3 mL of lidocaine 1%.  An 18 gauge 3.5 in Touhy needle was inserted and advanced under intermittent fluoroscopic views into the epidural space by loss of resistance to saline. Proper needle position was confirmed under AP, oblique, and lateral fluoroscopic views.  Negative aspiration for blood or CSF was confirmed.  1 mL of contrast was injected, which revealed spread within the epidural space from T9 to T11 .  Then a combination of 10 mg of dexamethasone with 2 mL of 0.25% bupivacaine and 1 mL of preservative-free normal saline was easily injected.   There was no pain on injection. The needle was removed intact and bleeding was nil.  Sterile bandages were applied. The patient was taken to the recovery room for further observation in stable condition. The patient was then discharged home with no complications.

## 2023-03-20 NOTE — ANESTHESIA PREPROCEDURE EVALUATION
03/20/2023  Jaxson Ferrell is a 62 y.o., male presents for TESI.    Other Medical History   High cholesterol Arthritis   Spinal stenosis Kidney stones   Venous insufficiency of both lower extremities Osteoporosis   Difficulty urinating      Surgical History    SPINE SURGERY TONSILLECTOMY   COLONOSCOPY LITHOTRIPSY   CYSTOSCOPY W/ LASER LITHOTRIPSY spinal injections       Pre-op Assessment    I have reviewed the Patient Summary Reports.     I have reviewed the Nursing Notes. I have reviewed the NPO Status.   I have reviewed the Medications.     Review of Systems  Anesthesia Hx:  Problems with urinary retention after narcotic use. Patient requests narcotics to be avoided if possible. Personal Hx of Anesthesia complications   Social:  Non-Smoker    Renal/:   Chronic Renal Disease renal calculi (States he has had urinary retention after GA and req'd catheterization. Believes narcotics are the cause, therefore requests no narcotic medication with this case.)    Musculoskeletal:   Arthritis         Physical Exam  General: Well nourished, Cooperative, Alert and Oriented    Airway:  Mallampati: III   Mouth Opening: Normal  TM Distance: Normal  Neck ROM: Flexion Decreased, Right Lateral Motion Decreased, Extension Decreased, Extension Painful    Dental:  Intact    Chest/Lungs:  Clear to auscultation, Normal Respiratory Rate    Heart:  Rate: Normal  Rhythm: Regular Rhythm  Sounds: Normal    Abdomen:  Normal, Soft, Nontender        Anesthesia Plan  Type of Anesthesia, risks & benefits discussed:    Anesthesia Type: MAC  Intra-op Monitoring Plan: Standard ASA Monitors  Post Op Pain Control Plan: multimodal analgesia  Induction:  IV  Airway Plan: Direct  Informed Consent: Informed consent signed with the Patient and all parties understand the risks and agree with anesthesia plan.  All questions answered.   ASA Score:  2  Day of Surgery Review of History & Physical: H&P Update referred to the surgeon/provider.    Ready For Surgery From Anesthesia Perspective.     .

## 2023-03-20 NOTE — PLAN OF CARE
0915 pt has met al discharge criteria, no complaints  or concerns, states ready to go home. Friend reji to drive pt home.

## 2023-03-20 NOTE — ANESTHESIA POSTPROCEDURE EVALUATION
Anesthesia Post Evaluation    Patient: Jaxson Ferrell    Procedure(s) Performed: Procedure(s) (LRB):  INJECTION, STEROID, SPINE, THORACIC, EPIDURAL T10-11 interlaminar (N/A)    Final Anesthesia Type: MAC      Patient location during evaluation: OPS  Patient participation: Yes- Able to Participate  Level of consciousness: awake and alert  Post-procedure vital signs: reviewed and stable  Pain management: adequate  Airway patency: patent    PONV status at discharge: No PONV  Anesthetic complications: no      Cardiovascular status: blood pressure returned to baseline  Respiratory status: unassisted and room air  Hydration status: euvolemic  Follow-up not needed.          Vitals Value Taken Time   /65 03/20/23 0750   Temp 36.8 °C (98.2 °F) 03/20/23 0750   Pulse 85 03/20/23 0750   Resp 20 03/20/23 0800   SpO2 98 % 03/20/23 0750         No case tracking events are documented in the log.      Pain/Nidhi Score: No data recorded

## 2023-03-21 VITALS
HEART RATE: 78 BPM | SYSTOLIC BLOOD PRESSURE: 112 MMHG | DIASTOLIC BLOOD PRESSURE: 71 MMHG | RESPIRATION RATE: 16 BRPM | HEIGHT: 70 IN | BODY MASS INDEX: 28.85 KG/M2 | OXYGEN SATURATION: 100 % | WEIGHT: 201.5 LBS | TEMPERATURE: 98 F

## 2023-04-18 ENCOUNTER — TELEPHONE (OUTPATIENT)
Dept: INTERNAL MEDICINE | Facility: CLINIC | Age: 63
End: 2023-04-18

## 2023-04-24 ENCOUNTER — OFFICE VISIT (OUTPATIENT)
Dept: PAIN MEDICINE | Facility: CLINIC | Age: 63
End: 2023-04-24
Payer: OTHER GOVERNMENT

## 2023-04-24 VITALS
TEMPERATURE: 99 F | SYSTOLIC BLOOD PRESSURE: 120 MMHG | HEIGHT: 70 IN | WEIGHT: 201 LBS | DIASTOLIC BLOOD PRESSURE: 88 MMHG | BODY MASS INDEX: 28.77 KG/M2

## 2023-04-24 DIAGNOSIS — M51.34 THORACIC DEGENERATIVE DISC DISEASE: ICD-10-CM

## 2023-04-24 DIAGNOSIS — M54.9 MID BACK PAIN: Primary | ICD-10-CM

## 2023-04-24 PROCEDURE — 99214 PR OFFICE/OUTPT VISIT, EST, LEVL IV, 30-39 MIN: ICD-10-PCS | Mod: ,,, | Performed by: ANESTHESIOLOGY

## 2023-04-24 PROCEDURE — 99214 OFFICE O/P EST MOD 30 MIN: CPT | Mod: ,,, | Performed by: ANESTHESIOLOGY

## 2023-04-24 NOTE — PROGRESS NOTES
Autumn Buckley MD        PATIENT NAME: Jaxson Ferrell  : 1960  DATE: 23  MRN: [id number]      Billing Provider: Autumn Buckley MD  Level of Service:   Patient PCP Information       Provider PCP Type    Anshul East MD General            Reason for Visit / Chief Complaint: Back Pain (Patient reports that the procedure did not help much. Rates pain today at 7/10. Reports not much relief with medications.)       Update PCP  Update Chief Complaint         History of Present Illness / Problem Focused Workflow     Jaxson Ferrell presents to the clinic with Back Pain (Patient reports that the procedure did not help much. Rates pain today at 7/10. Reports not much relief with medications.)     Patient presents for f/u of degenerative disc disease, thoracic and lumbar .  On 2019 patient underwent removal of L5-S1 instrumentation with L4-5 and L5-S1 a TLIF with pedicle screws and post anterior lateral bone.  He had stabilization with his symptoms after surgery.  He returned to Dr. Rivas at the end of  due to increased back pain and notable right leg weakness.    He currently takes Gabapentin 800 mg TID, Baclofen 10 mg BID      Primary pain located to right low thoracic with spread on occasion to left side . States pain feels like a TENS unit when it contracts. Pain to this area wakes him up with spasms. He is unable to perform core exercises. Pain intense with walking, sweeping, mopping, sitting too long and standing too long.   Pain raidates to bilateral low back, bilateral buttocks, bilateral thighs to lateral aspect to bilateral ankles and bilateral feet numb on bottom with intermittent numbness to toes.   Pain score 8/10. Daily pain 7/10-10/10. Feels like sharp stabbing pain. He also has weakness to bilateral legs.            Back Pain  Associated symptoms include leg pain and numbness.     Review of Systems     Review of Systems   Musculoskeletal:  Positive for back pain and leg  "pain.   Neurological:  Positive for numbness.   All other systems reviewed and are negative.     Medical / Social / Family History     Past Medical History:   Diagnosis Date    Arthritis     Difficulty urinating     with anesthesia when receives "pain meds"    High cholesterol     Kidney stones     Osteoporosis     Spinal stenosis     thoracic    Venous insufficiency of both lower extremities        Past Surgical History:   Procedure Laterality Date    COLONOSCOPY N/A     CYSTOSCOPY W/ LASER LITHOTRIPSY      EPIDURAL STEROID INJECTION INTO THORACIC SPINE N/A 3/20/2023    Procedure: INJECTION, STEROID, SPINE, THORACIC, EPIDURAL T10-11 interlaminar;  Surgeon: Autumn Buckley MD;  Location: [facility];  Service: Pain Management;  Laterality: N/A;  T10-11 interlaminar    LITHOTRIPSY      spinal injections      SPINE SURGERY      fusion 2 levels    TONSILLECTOMY         Social History  Mr. Ferrell  reports that he has never smoked. He has never used smokeless tobacco. He reports current alcohol use of about 1.0 standard drink per week. He reports that he does not use drugs.    Family History  Mr.'s Ferrell family history includes Breast cancer in his mother; Colon cancer in his father; Coronary artery disease in his father; Prostate cancer in his father.    Medications and Allergies     Medications  Outpatient Medications Marked as Taking for the 4/24/23 encounter (Office Visit) with Autumn Buckley MD   Medication Sig Dispense Refill    acetaminophen (TYLENOL) 650 MG TbSR Take 650 mg by mouth 2 (two) times daily.      atorvastatin (LIPITOR) 40 MG tablet Take 40 mg by mouth.      baclofen (LIORESAL) 10 MG tablet Take 10 mg by mouth 2 (two) times daily.      co-enzyme Q-10 30 mg capsule Take 30 mg by mouth 3 (three) times daily.      gabapentin (NEURONTIN) 800 MG tablet Take 800 mg by mouth 3 (three) times daily.      loratadine-pseudoephedrine  mg (CLARITIN-D 24-HOUR)  mg per 24 hr tablet Take 1 tablet by " mouth once daily.      nabumetone (RELAFEN) 500 MG tablet Take 750 mg by mouth 2 (two) times daily as needed for Pain.      PROLIA 60 mg/mL Syrg Inject into the skin.      tamsulosin (FLOMAX) 0.4 mg Cap Take 1 capsule by mouth.      vitamin D (VITAMIN D3) 1000 units Tab Take 5,000 Units by mouth 3 (three) times a week.         Allergies  Review of patient's allergies indicates:  No Known Allergies    Physical Examination     Vitals:    04/24/23 1152   BP: 120/88   Temp: 98.7 °F (37.1 °C)     Spine Musculoskeletal Exam    Gait    Gait is normal.    Inspection    Thoracolumbar        Prior incision: lateral lumbar    Incision: clean, dry, intact and well-healed    Incisional drainage: none    Palpation    Thoracolumbar    Tenderness: present      Paraspinous: right    Right      Masses: none      Spasms: none      Crepitus: none      Muscle tone: increased    Left      Masses: none      Spasms: none      Crepitus: none      Muscle tone: normal    Strength    Thoracolumbar    Thoracolumbar motor exam is normal.       Sensory    Thoracolumbar    Thoracolumbar sensation is normal.    General      Constitutional: appears stated age, well-developed and well-nourished    Scleral icterus: no    Labored breathing: no    Psychiatric: normal mood and affect and no acute distress    Neurological: alert and oriented x3    Skin: intact    Lymphadenopathy: none     Assessment and Plan (including Health Maintenance)      Problem List  Smart Sets  Document Outside HM   :    Plan:   Mid back pain  -     Case Request Operating Room: INJECTION, FACET JOINT    Thoracic degenerative disc disease  -     Case Request Operating Room: INJECTION, FACET JOINT       I am scheduling the patient for right T10-T12 medial branch blocks for his facet arthropathy.  Pending his response to these injections, I may also consider a referral to physiatry.    Problem List Items Addressed This Visit       Mid back pain - Primary    Relevant Orders    Case  Request Operating Room: INJECTION, FACET JOINT (Completed)    Thoracic degenerative disc disease    Relevant Orders    Case Request Operating Room: INJECTION, FACET JOINT (Completed)         Future Appointments   Date Time Provider Department Center   4/25/2023  9:00 AM Anshul Ly MD [place] [address]   6/1/2023  8:00 AM Autumn Buckley MD [facility]        There are no Patient Instructions on file for this visit.  No follow-ups on file.     Signature:  Autumn Buckley MD      Date of encounter: 4/24/23

## 2023-04-25 ENCOUNTER — OFFICE VISIT (OUTPATIENT)
Dept: INTERNAL MEDICINE | Facility: CLINIC | Age: 63
End: 2023-04-25
Payer: OTHER GOVERNMENT

## 2023-04-25 VITALS
SYSTOLIC BLOOD PRESSURE: 132 MMHG | WEIGHT: 200.63 LBS | OXYGEN SATURATION: 97 % | HEIGHT: 70 IN | DIASTOLIC BLOOD PRESSURE: 82 MMHG | BODY MASS INDEX: 28.72 KG/M2 | HEART RATE: 80 BPM

## 2023-04-25 DIAGNOSIS — E78.2 HYPERLIPIDEMIA, MIXED: ICD-10-CM

## 2023-04-25 DIAGNOSIS — Z00.00 WELLNESS EXAMINATION: Primary | ICD-10-CM

## 2023-04-25 DIAGNOSIS — M19.90 ARTHRITIS: ICD-10-CM

## 2023-04-25 DIAGNOSIS — N20.0 KIDNEY STONES: ICD-10-CM

## 2023-04-25 DIAGNOSIS — M81.0 OSTEOPOROSIS, UNSPECIFIED OSTEOPOROSIS TYPE, UNSPECIFIED PATHOLOGICAL FRACTURE PRESENCE: ICD-10-CM

## 2023-04-25 PROCEDURE — 99396 PREV VISIT EST AGE 40-64: CPT | Mod: ,,, | Performed by: INTERNAL MEDICINE

## 2023-04-25 PROCEDURE — 99396 PR PREVENTIVE VISIT,EST,40-64: ICD-10-PCS | Mod: ,,, | Performed by: INTERNAL MEDICINE

## 2023-04-25 NOTE — PROGRESS NOTES
Anshul East MD        PATIENT NAME: Jaxson Ferrell  : 1960  DATE: 23  MRN: [id number]      Billing Provider: Anshul East MD  Level of Service: [place] PREVENTIVE VISIT,EST,40-64  Patient PCP Information       Provider PCP Type    Anshul East MD General            Reason for Visit / Chief Complaint: Annual Exam (Wellness/)       Update PCP  Update Chief Complaint         History of Present Illness / Problem Focused Workflow     Jaxson Ferrell presents to the clinic with Annual Exam (Wellness/)     Aman is here for his annual wellness exam   His biggest problem is with his back issues  He is seeing a specialist for this working with injections and pain relief.  He has no medical issues.      Review of Systems   Review of Systems   Constitutional: Negative.    HENT: Negative.     Eyes: Negative.    Respiratory: Negative.     Cardiovascular: Negative.    Gastrointestinal: Negative.    Endocrine: Negative.    Genitourinary: Negative.    Musculoskeletal:  Positive for back pain.   Integumentary:  Negative.   Neurological: Negative.    Psychiatric/Behavioral: Negative.        Medical / Social / Family History     Past Medical History:   Diagnosis Date    Arthritis     Kidney stones     Osteoporosis     Spinal stenosis     thoracic       Past Surgical History:   Procedure Laterality Date    COLONOSCOPY N/A     CYSTOSCOPY W/ LASER LITHOTRIPSY      EPIDURAL STEROID INJECTION INTO THORACIC SPINE N/A 3/20/2023    Procedure: INJECTION, STEROID, SPINE, THORACIC, EPIDURAL T10-11 interlaminar;  Surgeon: Autumn Buckley MD;  Location: [facility];  Service: Pain Management;  Laterality: N/A;  T10-11 interlaminar    LITHOTRIPSY      spinal injections      SPINE SURGERY      fusion 2 levels    TONSILLECTOMY         Social History  Mr. Ferrell  reports that he quit smoking about 13 years ago. His smoking use included cigarettes. He has never used smokeless tobacco. He reports current alcohol use of  about 1.0 standard drink per week. He reports that he does not use drugs.    Family History  MrPaula's Mariaelena  family history includes Breast cancer in his mother; Colon cancer in his father; Coronary artery disease in his father; Prostate cancer in his father.    Medications and Allergies     Medications  Outpatient Medications Marked as Taking for the 4/25/23 encounter (Office Visit) with Anshul Ly MD   Medication Sig Dispense Refill    acetaminophen (TYLENOL) 650 MG TbSR Take 650 mg by mouth 2 (two) times daily.      atorvastatin (LIPITOR) 40 MG tablet Take 40 mg by mouth.      baclofen (LIORESAL) 10 MG tablet Take 10 mg by mouth 2 (two) times daily.      co-enzyme Q-10 30 mg capsule Take 30 mg by mouth 3 (three) times daily.      gabapentin (NEURONTIN) 800 MG tablet Take 800 mg by mouth 3 (three) times daily.      loratadine-pseudoephedrine  mg (CLARITIN-D 24-HOUR)  mg per 24 hr tablet Take 1 tablet by mouth once daily.      nabumetone (RELAFEN) 500 MG tablet Take 750 mg by mouth 2 (two) times daily as needed for Pain.      PROLIA 60 mg/mL Syrg Inject into the skin.      tamsulosin (FLOMAX) 0.4 mg Cap Take 1 capsule by mouth.      vitamin D (VITAMIN D3) 1000 units Tab Take 5,000 Units by mouth 3 (three) times a week.         Allergies  Review of patient's allergies indicates:  No Known Allergies    Physical Examination     Vitals:    04/25/23 0907   BP: 132/82   Pulse: 80     Physical Exam  Vitals reviewed.   Constitutional:       Appearance: Normal appearance.   HENT:      Head: Normocephalic.      Right Ear: Tympanic membrane normal.      Left Ear: Tympanic membrane normal.      Nose: Nose normal.      Mouth/Throat:      Pharynx: Oropharynx is clear.   Eyes:      Extraocular Movements: Extraocular movements intact.      Pupils: Pupils are equal, round, and reactive to light.   Cardiovascular:      Rate and Rhythm: Normal rate and regular rhythm.      Pulses: Normal pulses.      Heart  sounds: Normal heart sounds.   Pulmonary:      Effort: Pulmonary effort is normal.      Breath sounds: Normal breath sounds.   Abdominal:      General: Abdomen is flat. Bowel sounds are normal.      Palpations: Abdomen is soft.   Musculoskeletal:         General: Normal range of motion.      Cervical back: Normal range of motion.   Skin:     General: Skin is warm and dry.   Neurological:      General: No focal deficit present.      Mental Status: He is alert and oriented to person, place, and time.   Psychiatric:         Mood and Affect: Mood normal.         Behavior: Behavior normal.        Assessment and Plan (including Health Maintenance)      Problem List  Smart Sets  Document Outside HM   :    Plan:   Wellness examination    Osteoporosis, unspecified osteoporosis type, unspecified pathological fracture presence    Hyperlipidemia, mixed    Arthritis    Kidney stones     I do not have any of his lab results today he did it [facility] and there is not anything available  We will review labs and call him back   Plans to see him back in 1 year for annual wellness.           Health Maintenance Due   Topic Date Due    Hepatitis C Screening  Never done    HIV Screening  Never done    TETANUS VACCINE  Never done    Hemoglobin A1c (Diabetic Prevention Screening)  Never done    COVID-19 Vaccine (4 - Booster for Pfizer series) 08/16/2021    Influenza Vaccine (1) Never done       Problem List Items Addressed This Visit          Cardiac/Vascular    Hyperlipidemia, mixed (Chronic)       Renal/    Kidney stones (Chronic)       Orthopedic    Arthritis    Osteoporosis (Chronic)     Other Visit Diagnoses       Wellness examination    -  Primary            Health Maintenance Topics with due status: Not Due       Topic Last Completion Date    Colorectal Cancer Screening 07/25/2017    Lipid Panel 02/03/2022       Future Appointments   Date Time Provider Department Center   6/1/2023  8:00 AM Autumn Buckley MD [facility]             Signature:  Anshul East MD  [facility]  [address] [id number]    Date of encounter: 4/25/23

## 2023-04-26 ENCOUNTER — PATIENT OUTREACH (OUTPATIENT)
Dept: ADMINISTRATIVE | Facility: HOSPITAL | Age: 63
End: 2023-04-26
Payer: OTHER GOVERNMENT

## 2023-04-26 NOTE — PROGRESS NOTES
The following record(s)  below were uploaded for Health Maintenance .    COLONOSCOPY     12/06/22     Population Health Outreach.

## 2023-07-13 ENCOUNTER — TELEPHONE (OUTPATIENT)
Dept: ORTHOPEDICS | Facility: CLINIC | Age: 63
End: 2023-07-13
Payer: OTHER GOVERNMENT

## 2023-07-13 NOTE — TELEPHONE ENCOUNTER
Called patient to let him know that I did send MRI results and that he would need to come in to sign a consent of release to file for an appeal.  He was then questioning about the records was sent to make sure that area of the repost were highlighted to make the person who is reviewing the records know where to find the information they need to review

## 2023-09-07 ENCOUNTER — PATIENT MESSAGE (OUTPATIENT)
Dept: RESEARCH | Facility: HOSPITAL | Age: 63
End: 2023-09-07
Payer: OTHER GOVERNMENT

## 2023-09-26 ENCOUNTER — TELEPHONE (OUTPATIENT)
Dept: PAIN MEDICINE | Facility: CLINIC | Age: 63
End: 2023-09-26
Payer: OTHER GOVERNMENT

## 2023-09-26 NOTE — TELEPHONE ENCOUNTER
----- Message from Cari Stone sent at 9/25/2023  3:31 PM CDT -----  Regarding: Approval for injections  Mr. Ferrell is approved for the injections.  Please schedule his procedure.  Per Mr. Ferrell he will be out of town 10-09-23 to 10-20-23 so any time before or after is fine with him.  The authorization is in .  Thanks.

## 2023-10-17 DIAGNOSIS — M51.34 THORACIC DEGENERATIVE DISC DISEASE: ICD-10-CM

## 2023-10-17 DIAGNOSIS — M54.9 MID BACK PAIN: Primary | ICD-10-CM

## 2023-10-17 DIAGNOSIS — M51.34 DEGENERATIVE DISC DISEASE, THORACIC: ICD-10-CM

## 2023-11-07 ENCOUNTER — OFFICE VISIT (OUTPATIENT)
Dept: PAIN MEDICINE | Facility: CLINIC | Age: 63
End: 2023-11-07
Payer: OTHER GOVERNMENT

## 2023-11-07 VITALS
BODY MASS INDEX: 27.2 KG/M2 | SYSTOLIC BLOOD PRESSURE: 135 MMHG | DIASTOLIC BLOOD PRESSURE: 92 MMHG | WEIGHT: 190 LBS | HEIGHT: 70 IN | HEART RATE: 74 BPM | TEMPERATURE: 99 F

## 2023-11-07 DIAGNOSIS — M54.9 MID BACK PAIN: ICD-10-CM

## 2023-11-07 DIAGNOSIS — M47.814 THORACIC SPONDYLOSIS: ICD-10-CM

## 2023-11-07 DIAGNOSIS — M51.34 THORACIC DEGENERATIVE DISC DISEASE: Primary | ICD-10-CM

## 2023-11-07 PROCEDURE — 99213 OFFICE O/P EST LOW 20 MIN: CPT | Mod: ,,, | Performed by: ANESTHESIOLOGY

## 2023-11-07 PROCEDURE — 99213 PR OFFICE/OUTPT VISIT, EST, LEVL III, 20-29 MIN: ICD-10-PCS | Mod: ,,, | Performed by: ANESTHESIOLOGY

## 2023-11-07 NOTE — PROGRESS NOTES
Autumn Buckley MD        PATIENT NAME: Jaxson Ferrell  : 1960  DATE: 23  MRN: [id number]      Billing Provider: Autumn Buckley MD  Level of Service:   Patient PCP Information       Provider PCP Type    Anshul East MD General            Reason for Visit / Chief Complaint: Low-back Pain (Pre op MBB Rt T10-12 23 C/O pain level 7, Taking pain meds for pain.)       Update PCP  Update Chief Complaint         History of Present Illness / Problem Focused Workflow     Jaxson Ferrell presents to the clinic with Low-back Pain (Pre op MBB Rt T10-12 23 C/O pain level 7, Taking pain meds for pain.)     Patient presents for f/u of degenerative disc disease, thoracic and lumbar .  On 2019 patient underwent removal of L5-S1 instrumentation with L4-5 and L5-S1 a TLIF with pedicle screws and post anterior lateral bone.  He had stabilization with his symptoms after surgery.  He returned to Dr. Rivas at the end of  due to increased back pain and notable right leg weakness.       Primary pain located to right low thoracic with spread on occasion to left side . States pain feels like a TENS unit when it contracts. Pain to this area wakes him up with spasms. He is unable to perform core exercises. Pain intense with walking, sweeping, mopping, sitting too long and standing too long.   Pain raidates to bilateral low back, bilateral buttocks, bilateral thighs to lateral aspect to bilateral ankles and bilateral feet numb on bottom with intermittent numbness to toes. Feels like sharp stabbing pain.           Back Pain  Associated symptoms include leg pain and numbness.   Low-back Pain  Associated symptoms include leg pain and numbness.       Review of Systems     Review of Systems   Musculoskeletal:  Positive for back pain and leg pain.   Neurological:  Positive for numbness.   All other systems reviewed and are negative.       Medical / Social / Family History     Past Medical History:    Diagnosis Date    Arthritis     High cholesterol     Kidney stones     Osteoporosis     Personal history of colonic polyps     Spinal stenosis     thoracic    Urinary retention     after anesthesia       Past Surgical History:   Procedure Laterality Date    COLONOSCOPY N/A 12/06/2022    Varghese Valerie    CYSTOSCOPY W/ LASER LITHOTRIPSY      EPIDURAL STEROID INJECTION INTO THORACIC SPINE N/A 03/20/2023    Procedure: INJECTION, STEROID, SPINE, THORACIC, EPIDURAL T10-11 interlaminar;  Surgeon: Autumn Buckley MD;  Location: [facility];  Service: Pain Management;  Laterality: N/A;  T10-11 interlaminar    spinal injections      SPINE SURGERY      fusion 2 levels with rods and screws, anterior approach    TONSILLECTOMY      turbinate reduction         Social History  Mr. Ferrell  reports that he quit smoking about 13 years ago. His smoking use included cigarettes. He has never used smokeless tobacco. He reports current alcohol use of about 1.0 standard drink of alcohol per week. He reports that he does not use drugs.    Family History  Mr.'s Ferrell family history includes Breast cancer in his mother; Colon cancer in his father; Coronary artery disease in his father; Prostate cancer in his father.    Medications and Allergies     Medications  Outpatient Medications Marked as Taking for the 11/7/23 encounter (Office Visit) with Autumn Buckley MD   Medication Sig Dispense Refill    acetaminophen (TYLENOL) 650 MG TbSR Take 650 mg by mouth 2 (two) times daily.      atorvastatin (LIPITOR) 40 MG tablet Take 40 mg by mouth once daily.      baclofen (LIORESAL) 10 MG tablet Take 10 mg by mouth 2 (two) times daily.      co-enzyme Q-10 30 mg capsule Take 30 mg by mouth once daily.      gabapentin (NEURONTIN) 800 MG tablet Take 800 mg by mouth 3 (three) times daily.      loratadine-pseudoephedrine 5-120 mg (CLARITIN-D 12-HOUR) 5-120 mg per tablet Take 1 tablet by mouth daily as needed.      nabumetone (RELAFEN) 500 MG tablet  Take 750 mg by mouth 2 (two) times daily as needed for Pain.      PROLIA 60 mg/mL Syrg Inject 60 mg into the skin every 6 (six) months.      tamsulosin (FLOMAX) 0.4 mg Cap Take 1 capsule by mouth once daily.      vitamin D (VITAMIN D3) 1000 units Tab Take 5,000 Units by mouth 3 (three) times a week.         Allergies  Review of patient's allergies indicates:  No Known Allergies    Physical Examination     Vitals:    11/07/23 1009   BP: (!) 135/92   Pulse: 74   Temp: 98.7 °F (37.1 °C)     Spine Musculoskeletal Exam    Gait    Gait is normal.    Inspection    Thoracolumbar        Prior incision: lateral lumbar    Incision: clean, dry, intact and well-healed    Incisional drainage: none    Palpation    Thoracolumbar    Tenderness: present      Paraspinous: right    Right      Masses: none      Spasms: none      Crepitus: none      Muscle tone: increased    Left      Masses: none      Spasms: none      Crepitus: none      Muscle tone: normal    Strength    Thoracolumbar    Thoracolumbar motor exam is normal.       Sensory    Thoracolumbar    Thoracolumbar sensation is normal.    General      Constitutional: appears stated age, well-developed and well-nourished    Scleral icterus: no    Labored breathing: no    Psychiatric: normal mood and affect and no acute distress    Neurological: alert and oriented x3    Skin: intact    Lymphadenopathy: none  CV: extremities warm, well-perfused  Resp: nonlabored breathing      Assessment and Plan (including Health Maintenance)      Problem List  Smart Sets  Document Outside HM   :    Plan:   Thoracic degenerative disc disease    Mid back pain    Thoracic spondylosis       I am scheduling the patient for right T10-T12 medial branch blocks for his chronic mid back pain due to facet arthropathy.  It was discussed with the patient and he wishes to proceed.    Problem List Items Addressed This Visit       Thoracic degenerative disc disease - Primary    Mid back pain    Thoracic  spondylosis         Future Appointments   Date Time Provider Department Center   12/5/2023  9:45 AM Autumn Buckley MD [facility]   5/2/2024  8:40 AM Anshul Ly MD [place] [id number]        There are no Patient Instructions on file for this visit.  No follow-ups on file.     Signature:  Autumn Buckley MD      Date of encounter: 11/7/23

## 2023-11-07 NOTE — H&P (VIEW-ONLY)
Autumn Buckley MD        PATIENT NAME: Jaxson Frerell  : 1960  DATE: 23  MRN: [id number]      Billing Provider: Autumn Buckley MD  Level of Service:   Patient PCP Information       Provider PCP Type    Anshul East MD General            Reason for Visit / Chief Complaint: Low-back Pain (Pre op MBB Rt T10-12 23 C/O pain level 7, Taking pain meds for pain.)       Update PCP  Update Chief Complaint         History of Present Illness / Problem Focused Workflow     Jaxson Ferrell presents to the clinic with Low-back Pain (Pre op MBB Rt T10-12 23 C/O pain level 7, Taking pain meds for pain.)     Patient presents for f/u of degenerative disc disease, thoracic and lumbar .  On 2019 patient underwent removal of L5-S1 instrumentation with L4-5 and L5-S1 a TLIF with pedicle screws and post anterior lateral bone.  He had stabilization with his symptoms after surgery.  He returned to Dr. Rivas at the end of  due to increased back pain and notable right leg weakness.       Primary pain located to right low thoracic with spread on occasion to left side . States pain feels like a TENS unit when it contracts. Pain to this area wakes him up with spasms. He is unable to perform core exercises. Pain intense with walking, sweeping, mopping, sitting too long and standing too long.   Pain raidates to bilateral low back, bilateral buttocks, bilateral thighs to lateral aspect to bilateral ankles and bilateral feet numb on bottom with intermittent numbness to toes. Feels like sharp stabbing pain.           Back Pain  Associated symptoms include leg pain and numbness.   Low-back Pain  Associated symptoms include leg pain and numbness.       Review of Systems     Review of Systems   Musculoskeletal:  Positive for back pain and leg pain.   Neurological:  Positive for numbness.   All other systems reviewed and are negative.       Medical / Social / Family History     Past Medical History:    Diagnosis Date    Arthritis     High cholesterol     Kidney stones     Osteoporosis     Personal history of colonic polyps     Spinal stenosis     thoracic    Urinary retention     after anesthesia       Past Surgical History:   Procedure Laterality Date    COLONOSCOPY N/A 12/06/2022    Varghese Valerie    CYSTOSCOPY W/ LASER LITHOTRIPSY      EPIDURAL STEROID INJECTION INTO THORACIC SPINE N/A 03/20/2023    Procedure: INJECTION, STEROID, SPINE, THORACIC, EPIDURAL T10-11 interlaminar;  Surgeon: Autumn Buckley MD;  Location: [facility];  Service: Pain Management;  Laterality: N/A;  T10-11 interlaminar    spinal injections      SPINE SURGERY      fusion 2 levels with rods and screws, anterior approach    TONSILLECTOMY      turbinate reduction         Social History  Mr. Ferrell  reports that he quit smoking about 13 years ago. His smoking use included cigarettes. He has never used smokeless tobacco. He reports current alcohol use of about 1.0 standard drink of alcohol per week. He reports that he does not use drugs.    Family History  Mr.'s Ferrell family history includes Breast cancer in his mother; Colon cancer in his father; Coronary artery disease in his father; Prostate cancer in his father.    Medications and Allergies     Medications  Outpatient Medications Marked as Taking for the 11/7/23 encounter (Office Visit) with Autumn Buckley MD   Medication Sig Dispense Refill    acetaminophen (TYLENOL) 650 MG TbSR Take 650 mg by mouth 2 (two) times daily.      atorvastatin (LIPITOR) 40 MG tablet Take 40 mg by mouth once daily.      baclofen (LIORESAL) 10 MG tablet Take 10 mg by mouth 2 (two) times daily.      co-enzyme Q-10 30 mg capsule Take 30 mg by mouth once daily.      gabapentin (NEURONTIN) 800 MG tablet Take 800 mg by mouth 3 (three) times daily.      loratadine-pseudoephedrine 5-120 mg (CLARITIN-D 12-HOUR) 5-120 mg per tablet Take 1 tablet by mouth daily as needed.      nabumetone (RELAFEN) 500 MG tablet  Take 750 mg by mouth 2 (two) times daily as needed for Pain.      PROLIA 60 mg/mL Syrg Inject 60 mg into the skin every 6 (six) months.      tamsulosin (FLOMAX) 0.4 mg Cap Take 1 capsule by mouth once daily.      vitamin D (VITAMIN D3) 1000 units Tab Take 5,000 Units by mouth 3 (three) times a week.         Allergies  Review of patient's allergies indicates:  No Known Allergies    Physical Examination     Vitals:    11/07/23 1009   BP: (!) 135/92   Pulse: 74   Temp: 98.7 °F (37.1 °C)     Spine Musculoskeletal Exam    Gait    Gait is normal.    Inspection    Thoracolumbar        Prior incision: lateral lumbar    Incision: clean, dry, intact and well-healed    Incisional drainage: none    Palpation    Thoracolumbar    Tenderness: present      Paraspinous: right    Right      Masses: none      Spasms: none      Crepitus: none      Muscle tone: increased    Left      Masses: none      Spasms: none      Crepitus: none      Muscle tone: normal    Strength    Thoracolumbar    Thoracolumbar motor exam is normal.       Sensory    Thoracolumbar    Thoracolumbar sensation is normal.    General      Constitutional: appears stated age, well-developed and well-nourished    Scleral icterus: no    Labored breathing: no    Psychiatric: normal mood and affect and no acute distress    Neurological: alert and oriented x3    Skin: intact    Lymphadenopathy: none  CV: extremities warm, well-perfused  Resp: nonlabored breathing      Assessment and Plan (including Health Maintenance)      Problem List  Smart Sets  Document Outside HM   :    Plan:   Thoracic degenerative disc disease    Mid back pain    Thoracic spondylosis       I am scheduling the patient for right T10-T12 medial branch blocks for his chronic mid back pain due to facet arthropathy.  It was discussed with the patient and he wishes to proceed.    Problem List Items Addressed This Visit       Thoracic degenerative disc disease - Primary    Mid back pain    Thoracic  spondylosis         Future Appointments   Date Time Provider Department Center   12/5/2023  9:45 AM Autumn Buckley MD [facility]   5/2/2024  8:40 AM Anshul Ly MD [place] [id number]        There are no Patient Instructions on file for this visit.  No follow-ups on file.     Signature:  Autumn Buckley MD      Date of encounter: 11/7/23

## 2023-11-15 ENCOUNTER — ANESTHESIA EVENT (OUTPATIENT)
Dept: SURGERY | Facility: HOSPITAL | Age: 63
End: 2023-11-15
Payer: OTHER GOVERNMENT

## 2023-11-15 NOTE — ANESTHESIA PREPROCEDURE EVALUATION
11/15/2023  Jaxson Ferrell is a 63 y.o., male presents as an outpatient for medial branch block right T10-12.  Patient tolerated IV sedation in August for epidural steroid injection.  Patient notes urinary retention with prior general anesthetics which he attributes to narcotics    Last 3 sets of Vitals        11/7/2023    10:13 AM 11/16/2023     6:49 AM 11/16/2023     6:59 AM   Vitals - 1 value per visit   SYSTOLIC  128    DIASTOLIC  84    Pulse  69    Temp  36.8 °C (98.2 °F)    Resp   18   SPO2  97 %    Pain Score Seven       Pre-op Assessment    I have reviewed the Patient Summary Reports.    I have reviewed the NPO Status.   I have reviewed the Medications.     Review of Systems  Anesthesia Hx:               Denies Personal Hx of Anesthesia complications.                    Social:  Non-Smoker       Cardiovascular:   Functional Capacity 2 METS                         Renal/:   renal calculi                   Physical Exam  General: Well nourished, Cooperative, Alert and Oriented    Airway:  Mallampati: II   Mouth Opening: Normal  TM Distance: Normal  Tongue: Normal  Neck ROM: Normal ROM    Dental:  Periodontal disease    Chest/Lungs:  Clear to auscultation, Normal Respiratory Rate    Heart:  Rate: Normal  Rhythm: Regular Rhythm        Anesthesia Plan  Type of Anesthesia, risks & benefits discussed:    Anesthesia Type: Gen Natural Airway  Intra-op Monitoring Plan: Standard ASA Monitors  Induction:  IV  Informed Consent: Informed consent signed with the Patient and all parties understand the risks and agree with anesthesia plan.  All questions answered.   ASA Score: 2  Day of Surgery Review of History & Physical: H&P Update referred to the surgeon/provider.  Anesthesia Plan Notes: Narcotic avoidance if possible    Ready For Surgery From Anesthesia Perspective.     .

## 2023-11-16 ENCOUNTER — ANESTHESIA (OUTPATIENT)
Dept: SURGERY | Facility: HOSPITAL | Age: 63
End: 2023-11-16
Payer: OTHER GOVERNMENT

## 2023-11-16 ENCOUNTER — HOSPITAL ENCOUNTER (OUTPATIENT)
Facility: HOSPITAL | Age: 63
Discharge: HOME | End: 2023-11-16
Attending: ANESTHESIOLOGY | Admitting: ANESTHESIOLOGY
Payer: OTHER GOVERNMENT

## 2023-11-16 VITALS
OXYGEN SATURATION: 95 % | DIASTOLIC BLOOD PRESSURE: 68 MMHG | TEMPERATURE: 98 F | SYSTOLIC BLOOD PRESSURE: 106 MMHG | WEIGHT: 198.63 LBS | HEART RATE: 59 BPM | BODY MASS INDEX: 28.44 KG/M2 | HEIGHT: 70 IN | RESPIRATION RATE: 16 BRPM

## 2023-11-16 DIAGNOSIS — G89.29 CHRONIC BACK PAIN GREATER THAN 3 MONTHS DURATION: ICD-10-CM

## 2023-11-16 DIAGNOSIS — M54.9 CHRONIC BACK PAIN GREATER THAN 3 MONTHS DURATION: ICD-10-CM

## 2023-11-16 PROCEDURE — 64491 INJ PARAVERT F JNT C/T 2 LEV: CPT | Mod: RT | Performed by: ANESTHESIOLOGY

## 2023-11-16 PROCEDURE — 64490 INJ PARAVERT F JNT C/T 1 LEV: CPT | Mod: RT | Performed by: ANESTHESIOLOGY

## 2023-11-16 PROCEDURE — 25000003 PHARM REV CODE 250: Performed by: ANESTHESIOLOGY

## 2023-11-16 PROCEDURE — 63600175 PHARM REV CODE 636 W HCPCS: Performed by: NURSE ANESTHETIST, CERTIFIED REGISTERED

## 2023-11-16 PROCEDURE — 63600175 PHARM REV CODE 636 W HCPCS: Performed by: ANESTHESIOLOGY

## 2023-11-16 PROCEDURE — 64490 PR INJ DX/THER AGNT PARAVERT FACET JOINT,IMG GUIDE,CERV/THORAC, 1ST LEVEL: ICD-10-PCS | Mod: KX,RT,, | Performed by: ANESTHESIOLOGY

## 2023-11-16 PROCEDURE — 64491 INJ PARAVERT F JNT C/T 2 LEV: CPT | Mod: KX,RT,, | Performed by: ANESTHESIOLOGY

## 2023-11-16 PROCEDURE — D9220A PRA ANESTHESIA: ICD-10-PCS | Mod: ,,,

## 2023-11-16 PROCEDURE — 25000003 PHARM REV CODE 250: Performed by: NURSE ANESTHETIST, CERTIFIED REGISTERED

## 2023-11-16 PROCEDURE — 64491 PR INJ DX/THER AGNT PARAVERT FACET JOINT,IMG GUIDE,CERV/THORAC, 2ND LEVEL: ICD-10-PCS | Mod: KX,RT,, | Performed by: ANESTHESIOLOGY

## 2023-11-16 PROCEDURE — D9220A PRA ANESTHESIA: Mod: ,,,

## 2023-11-16 PROCEDURE — 37000008 HC ANESTHESIA 1ST 15 MINUTES: Performed by: ANESTHESIOLOGY

## 2023-11-16 PROCEDURE — 64490 INJ PARAVERT F JNT C/T 1 LEV: CPT | Mod: KX,RT,, | Performed by: ANESTHESIOLOGY

## 2023-11-16 RX ORDER — IPRATROPIUM BROMIDE AND ALBUTEROL SULFATE 2.5; .5 MG/3ML; MG/3ML
3 SOLUTION RESPIRATORY (INHALATION)
Status: CANCELLED | OUTPATIENT
Start: 2023-11-16

## 2023-11-16 RX ORDER — LIDOCAINE HYDROCHLORIDE 10 MG/ML
INJECTION, SOLUTION EPIDURAL; INFILTRATION; INTRACAUDAL; PERINEURAL
Status: DISCONTINUED | OUTPATIENT
Start: 2023-11-16 | End: 2023-11-16

## 2023-11-16 RX ORDER — LORAZEPAM 2 MG/ML
0.25 INJECTION INTRAMUSCULAR ONCE AS NEEDED
Status: CANCELLED | OUTPATIENT
Start: 2023-11-16 | End: 2035-04-13

## 2023-11-16 RX ORDER — ONDANSETRON 4 MG/1
8 TABLET, ORALLY DISINTEGRATING ORAL EVERY 6 HOURS PRN
Status: CANCELLED | OUTPATIENT
Start: 2023-11-16

## 2023-11-16 RX ORDER — BUPIVACAINE HYDROCHLORIDE 2.5 MG/ML
INJECTION, SOLUTION EPIDURAL; INFILTRATION; INTRACAUDAL
Status: DISCONTINUED | OUTPATIENT
Start: 2023-11-16 | End: 2023-11-16 | Stop reason: HOSPADM

## 2023-11-16 RX ORDER — PROCHLORPERAZINE EDISYLATE 5 MG/ML
5 INJECTION INTRAMUSCULAR; INTRAVENOUS EVERY 30 MIN PRN
Status: CANCELLED | OUTPATIENT
Start: 2023-11-16

## 2023-11-16 RX ORDER — ONDANSETRON 2 MG/ML
4 INJECTION INTRAMUSCULAR; INTRAVENOUS DAILY PRN
Status: CANCELLED | OUTPATIENT
Start: 2023-11-16

## 2023-11-16 RX ORDER — PROPOFOL 10 MG/ML
VIAL (ML) INTRAVENOUS
Status: DISCONTINUED | OUTPATIENT
Start: 2023-11-16 | End: 2023-11-16

## 2023-11-16 RX ORDER — MEPERIDINE HYDROCHLORIDE 25 MG/ML
12.5 INJECTION INTRAMUSCULAR; INTRAVENOUS; SUBCUTANEOUS EVERY 10 MIN PRN
Status: CANCELLED | OUTPATIENT
Start: 2023-11-16 | End: 2023-11-17

## 2023-11-16 RX ORDER — SODIUM CHLORIDE, SODIUM GLUCONATE, SODIUM ACETATE, POTASSIUM CHLORIDE AND MAGNESIUM CHLORIDE 30; 37; 368; 526; 502 MG/100ML; MG/100ML; MG/100ML; MG/100ML; MG/100ML
INJECTION, SOLUTION INTRAVENOUS CONTINUOUS
Status: CANCELLED | OUTPATIENT
Start: 2023-11-16 | End: 2023-12-16

## 2023-11-16 RX ORDER — LIDOCAINE HYDROCHLORIDE 10 MG/ML
1 INJECTION, SOLUTION EPIDURAL; INFILTRATION; INTRACAUDAL; PERINEURAL ONCE
Status: CANCELLED | OUTPATIENT
Start: 2023-11-16 | End: 2023-11-16

## 2023-11-16 RX ORDER — LIDOCAINE HYDROCHLORIDE 10 MG/ML
INJECTION, SOLUTION EPIDURAL; INFILTRATION; INTRACAUDAL; PERINEURAL
Status: DISCONTINUED
Start: 2023-11-16 | End: 2023-11-16 | Stop reason: HOSPADM

## 2023-11-16 RX ORDER — LIDOCAINE HYDROCHLORIDE 10 MG/ML
INJECTION, SOLUTION EPIDURAL; INFILTRATION; INTRACAUDAL; PERINEURAL
Status: DISCONTINUED | OUTPATIENT
Start: 2023-11-16 | End: 2023-11-16 | Stop reason: HOSPADM

## 2023-11-16 RX ADMIN — PROPOFOL 70 MG: 10 INJECTION, EMULSION INTRAVENOUS at 08:11

## 2023-11-16 RX ADMIN — LIDOCAINE HYDROCHLORIDE 25 MG: 10 INJECTION, SOLUTION EPIDURAL; INFILTRATION; INTRACAUDAL; PERINEURAL at 08:11

## 2023-11-16 RX ADMIN — PROPOFOL 10 MG: 10 INJECTION, EMULSION INTRAVENOUS at 09:11

## 2023-11-16 RX ADMIN — PROPOFOL 10 MG: 10 INJECTION, EMULSION INTRAVENOUS at 08:11

## 2023-11-16 RX ADMIN — SODIUM CHLORIDE, POTASSIUM CHLORIDE, SODIUM LACTATE AND CALCIUM CHLORIDE: 600; 310; 30; 20 INJECTION, SOLUTION INTRAVENOUS at 08:11

## 2023-11-16 RX ADMIN — PROPOFOL 20 MG: 10 INJECTION, EMULSION INTRAVENOUS at 08:11

## 2023-11-16 RX ADMIN — PROPOFOL 20 MG: 10 INJECTION, EMULSION INTRAVENOUS at 09:11

## 2023-11-16 NOTE — OP NOTE
Right T10-12 diagnostic medial branch blocks    Pre-Procedure Diagnoses:  1. Chronic pain syndrome  2. Mid back pain  3. Thoracic facet arthropathy    Post-Procedure Diagnoses:  1. Chronic pain syndrome  2. Mid back pain  3. Thoracic facet arthropathy    Anesthesia:  Local and MAC    Estimated Blood Loss:  None    Complications:  None    Informed Consent:  The procedure, risks, benefits, and alternatives were discussed with the patient. There were no contraindications to the procedure. The patient expressed understanding and agreed to proceed. Fully informed written consent was obtained.     Description of the Procedure:  The patient was taken to the operating room. IV access was obtained prior to the start of the procedure. The patient was positioned prone on the fluoroscopy table. Continuous hemodynamic monitoring was initiated and continued throughout the duration of the procedure. The skin overlying the lumbosacral spine was prepped with Chloraprep and draped into a sterile field. Fluoroscopy was used to identify the locations of the right side T10, T11, and T12 medial branch nerves. Skin anesthesia was achieved using 0.5 mL of 1% lidocaine over each injection site. A 25-gauge 3.5-inch Quinke spinal needle was slowly inserted and advanced under intermittent fluoroscopic guidance until it made bony contact at the target sites. Proper needle position was confirmed under AP, oblique, and lateral fluoroscopic views. Negative aspiration for blood or CSF was confirmed. Then 0.5 mL of 0.25% bupivacaine was easily injected at each level. There was no pain on injection. The needles were removed intact and bleeding was nil. Sterile bandages were applied. The patient was taken to the recovery room for further observation in stable condition. The patient was then discharged home without any complications.

## 2023-11-16 NOTE — TRANSFER OF CARE
Anesthesia Transfer of Care Note    Patient: Jaxson Ferrell    Procedure(s) Performed: Procedure(s) (LRB):  BLOCK, NERVE, FACET JOINT, CERVICAL, MEDIAL BRANCH Right MBB T10-12 (Right)    Patient location: PACU    Anesthesia Type: MAC    Transport from OR: Transported from OR on room air with adequate spontaneous ventilation    Post pain: adequate analgesia    Post assessment: no apparent anesthetic complications    Post vital signs: stable    Level of consciousness: awake    Nausea/Vomiting: no nausea/vomiting    Complications: none    Transfer of care protocol was followed

## 2023-11-16 NOTE — ANESTHESIA POSTPROCEDURE EVALUATION
Anesthesia Post Evaluation    Patient: Jaxson Ferrell    Procedure(s) Performed: Procedure(s) (LRB):  BLOCK, NERVE, FACET JOINT, CERVICAL, MEDIAL BRANCH Right MBB T10-12 (Right)    Final Anesthesia Type: MAC      Patient location during evaluation: PACU  Patient participation: Yes- Able to Participate  Level of consciousness: awake and alert  Post-procedure vital signs: reviewed and stable  Pain management: adequate  Airway patency: patent    PONV status at discharge: No PONV  Anesthetic complications: no      Cardiovascular status: blood pressure returned to baseline and stable  Respiratory status: unassisted  Hydration status: euvolemic  Follow-up not needed.            No case tracking events are documented in the log.      Pain/Nidhi Score: No data recorded

## 2023-11-16 NOTE — DISCHARGE SUMMARY
[facility] - Periop Services  Discharge Note  Short Stay    Procedure(s) (LRB):  BLOCK, NERVE, FACET JOINT, CERVICAL, MEDIAL BRANCH Right MBB T10-12 (Right)      OUTCOME: Patient tolerated treatment/procedure well without complication and is now ready for discharge.    DISPOSITION: Home or Self Care    FINAL DIAGNOSIS:  <principal problem not specified>    FOLLOWUP: In clinic    DISCHARGE INSTRUCTIONS:  No discharge procedures on file.     TIME SPENT ON DISCHARGE: 5 minutes

## 2023-12-05 ENCOUNTER — OFFICE VISIT (OUTPATIENT)
Dept: PAIN MEDICINE | Facility: CLINIC | Age: 63
End: 2023-12-05
Payer: OTHER GOVERNMENT

## 2023-12-05 VITALS
DIASTOLIC BLOOD PRESSURE: 85 MMHG | SYSTOLIC BLOOD PRESSURE: 130 MMHG | HEIGHT: 70 IN | HEART RATE: 66 BPM | WEIGHT: 190 LBS | BODY MASS INDEX: 27.2 KG/M2

## 2023-12-05 DIAGNOSIS — M51.34 THORACIC DEGENERATIVE DISC DISEASE: Primary | ICD-10-CM

## 2023-12-05 DIAGNOSIS — M47.814 THORACIC SPONDYLOSIS: ICD-10-CM

## 2023-12-05 DIAGNOSIS — M54.9 MID BACK PAIN: ICD-10-CM

## 2023-12-05 PROCEDURE — 99213 OFFICE O/P EST LOW 20 MIN: CPT | Mod: ,,, | Performed by: ANESTHESIOLOGY

## 2023-12-05 PROCEDURE — 99213 PR OFFICE/OUTPT VISIT, EST, LEVL III, 20-29 MIN: ICD-10-PCS | Mod: ,,, | Performed by: ANESTHESIOLOGY

## 2023-12-05 NOTE — PROGRESS NOTES
Autumn Buckley MD        PATIENT NAME: Jaxson Ferrell  : 1960  DATE: 23  MRN: [id number]      Billing Provider: Autumn Buckley MD  Level of Service:   Patient PCP Information       Provider PCP Type    Anshul East MD General            Reason for Visit / Chief Complaint: Mid-back Pain (Post op MBB 23 C/O pain level 7, Taking Gabapentin and ASA for pain, states procedure helped but still has some pain.)       Update PCP  Update Chief Complaint         History of Present Illness / Problem Focused Workflow     Jaxson Ferrell presents to the clinic with Mid-back Pain (Post op MBB 23 C/O pain level 7, Taking Gabapentin and ASA for pain, states procedure helped but still has some pain.)     Patient presents for f/u of degenerative disc disease, thoracic and lumbar .  On 2019 patient underwent removal of L5-S1 instrumentation with L4-5 and L5-S1 a TLIF with pedicle screws.  He had stabilization with his symptoms after surgery.  He returned to Dr. Rivas at the end of  due to increased back pain and notable right leg weakness.    Primary pain located to right low thoracic with spread on occasion to left side . States pain feels like a TENS unit when it contracts. Pain to this area wakes him up with spasms. He is unable to perform core exercises. Pain intense with walking, sweeping, mopping, sitting too long and standing too long.   Pain raidates to bilateral low back, bilateral buttocks, bilateral thighs to lateral aspect to bilateral ankles and bilateral feet numb on bottom with intermittent numbness to toes. Feels like sharp stabbing pain.  He underwent right T10-12 medial branch blocks a couple weeks ago. He no longer has the stabbing pain, but it has become dull. He is still having some axial back pain just below the levels of the MBB that were done, in the upper lumbar spine.      Back Pain  Associated symptoms include leg pain and numbness.   Low-back  Pain  Associated symptoms include leg pain and numbness.   Mid-back Pain  Associated symptoms include leg pain and numbness.       Review of Systems     Review of Systems   Musculoskeletal:  Positive for back pain and leg pain.   Neurological:  Positive for numbness.   All other systems reviewed and are negative.       Medical / Social / Family History     Past Medical History:   Diagnosis Date    Arthritis     High cholesterol     Kidney stones     Osteoporosis     Personal history of colonic polyps     Spinal stenosis     thoracic    Urinary retention     after anesthesia       Past Surgical History:   Procedure Laterality Date    COLONOSCOPY N/A 12/06/2022    Varghese Rizohire    CYSTOSCOPY W/ LASER LITHOTRIPSY      EPIDURAL STEROID INJECTION INTO THORACIC SPINE N/A 03/20/2023    Procedure: INJECTION, STEROID, SPINE, THORACIC, EPIDURAL T10-11 interlaminar;  Surgeon: Autumn Buckley MD;  Location: [facility] OR;  Service: Pain Management;  Laterality: N/A;  T10-11 interlaminar    INJECTION OF ANESTHETIC AGENT AROUND MEDIAL BRANCH NERVES INNERVATING CERVICAL FACET JOINT Right 11/16/2023    Procedure: BLOCK, NERVE, FACET JOINT, CERVICAL, MEDIAL BRANCH Right MBB T10-12;  Surgeon: Autumn Buckley MD;  Location: [facility] OR;  Service: Pain Management;  Laterality: Right;    spinal injections      SPINE SURGERY      fusion 2 levels with rods and screws, anterior approach    TONSILLECTOMY      turbinate reduction         Social History  Mr. Ferrell  reports that he quit smoking about 13 years ago. His smoking use included cigarettes. He has never used smokeless tobacco. He reports current alcohol use of about 7.0 standard drinks of alcohol per week. He reports that he does not currently use drugs after having used the following drugs: Marijuana.    Family History  Mr.'s Ferrell family history includes Breast cancer in his mother; Colon cancer in his father; Coronary artery disease in his father; Prostate cancer in his  father.    Medications and Allergies     Medications  Outpatient Medications Marked as Taking for the 12/5/23 encounter (Office Visit) with Autumn Buckley MD   Medication Sig Dispense Refill    acetaminophen (TYLENOL) 650 MG TbSR Take 650 mg by mouth 2 (two) times daily.      atorvastatin (LIPITOR) 40 MG tablet Take 40 mg by mouth once daily.      baclofen (LIORESAL) 10 MG tablet Take 10 mg by mouth 2 (two) times daily.      co-enzyme Q-10 30 mg capsule Take 30 mg by mouth once daily.      gabapentin (NEURONTIN) 800 MG tablet Take 800 mg by mouth 3 (three) times daily.      loratadine-pseudoephedrine 5-120 mg (CLARITIN-D 12-HOUR) 5-120 mg per tablet Take 1 tablet by mouth daily as needed.      nabumetone (RELAFEN) 500 MG tablet Take 750 mg by mouth 2 (two) times daily as needed for Pain.      PROLIA 60 mg/mL Syrg Inject 60 mg into the skin every 6 (six) months.      tamsulosin (FLOMAX) 0.4 mg Cap Take 1 capsule by mouth once daily.      vitamin D (VITAMIN D3) 1000 units Tab Take 5,000 Units by mouth 3 (three) times a week.         Allergies  Review of patient's allergies indicates:  No Known Allergies    Physical Examination     Vitals:    12/05/23 0944   BP: 130/85   Pulse: 66     Spine Musculoskeletal Exam    Gait    Gait is normal.    Inspection    Thoracolumbar        Prior incision: lateral lumbar    Incision: clean, dry, intact and well-healed    Incisional drainage: none    Palpation    Thoracolumbar    Tenderness: present      Paraspinous: right    Right      Masses: none      Spasms: none      Crepitus: none      Muscle tone: increased    Left      Masses: none      Spasms: none      Crepitus: none      Muscle tone: normal    Range of Motion    Thoracolumbar        Thoracolumbar range of motion additional comments: Restricted range of motion in the lumbar spine secondary to pain.  Positive facet loading.    Strength    Thoracolumbar    Thoracolumbar motor exam is normal.       Sensory    Thoracolumbar     Thoracolumbar sensation is normal.    General      Constitutional: appears stated age, well-developed and well-nourished    Scleral icterus: no    Labored breathing: no    Psychiatric: normal mood and affect and no acute distress    Neurological: alert and oriented x3    Skin: intact    Lymphadenopathy: none  CV: extremities warm, well-perfused  Resp: nonlabored breathing      Assessment and Plan (including Health Maintenance)      Problem List  Smart Sets  Document Outside HM   :    Plan:   Thoracic degenerative disc disease    Thoracic spondylosis    Mid back pain       He has gotten better after undergoing right T10-12 diagnostic medial branch blocks a couple weeks ago.  He is still having some axial back pain below sees levels, in the upper lumbar spine, consistent with findings of facet arthropathy seen on his MRI and positive facet loading on exam.  I am scheduling him for right L1 and L2 diagnostic medial branch blocks today.  The plan was discussed with the patient and he wishes to proceed.  He would like to consider radiofrequency ablation as well.    Problem List Items Addressed This Visit       Thoracic degenerative disc disease - Primary    Mid back pain    Thoracic spondylosis         Future Appointments   Date Time Provider Department Center   5/2/2024  8:40 AM Anshul Ly MD [place] [id number]        There are no Patient Instructions on file for this visit.  No follow-ups on file.     Signature:  Autumn Buckley MD      Date of encounter: 12/5/23

## 2024-01-11 ENCOUNTER — OFFICE VISIT (OUTPATIENT)
Dept: PAIN MEDICINE | Facility: CLINIC | Age: 64
End: 2024-01-11
Payer: OTHER GOVERNMENT

## 2024-01-11 VITALS
TEMPERATURE: 98 F | SYSTOLIC BLOOD PRESSURE: 121 MMHG | DIASTOLIC BLOOD PRESSURE: 83 MMHG | HEIGHT: 70 IN | HEART RATE: 77 BPM | WEIGHT: 190 LBS | BODY MASS INDEX: 27.2 KG/M2

## 2024-01-11 DIAGNOSIS — M54.9 CHRONIC BACK PAIN GREATER THAN 3 MONTHS DURATION: ICD-10-CM

## 2024-01-11 DIAGNOSIS — G89.29 CHRONIC BACK PAIN GREATER THAN 3 MONTHS DURATION: ICD-10-CM

## 2024-01-11 DIAGNOSIS — M47.816 LUMBAR SPONDYLOSIS: Primary | ICD-10-CM

## 2024-01-11 PROCEDURE — 99213 OFFICE O/P EST LOW 20 MIN: CPT | Mod: ,,, | Performed by: ANESTHESIOLOGY

## 2024-01-11 NOTE — H&P (VIEW-ONLY)
Autumn Buckley MD        PATIENT NAME: Jaxson Ferrell  : 1960  DATE: 24  MRN: [id number]      Billing Provider: Autumn Buckley MD  Level of Service:   Patient PCP Information       Provider PCP Type    Anshul East MD General            Reason for Visit / Chief Complaint: Back Pain (Pre op MBB L1-L2 24 C/O pain level 7,not taking pain meds.)       Update PCP  Update Chief Complaint         History of Present Illness / Problem Focused Workflow     Jaxson Ferrell presents to the clinic with Back Pain (Pre op MBB L1-L2 24 C/O pain level 7,not taking pain meds.)     Patient presents for f/u of degenerative disc disease, thoracic and lumbar .  On 2019 patient underwent removal of L5-S1 instrumentation with L4-5 and L5-S1 a TLIF with pedicle screws.  He had stabilization with his symptoms after surgery.  He returned to Dr. Rivas at the end of  due to increased back pain and notable right leg weakness.    Primary pain located to right low thoracic with spread on occasion to left side . States pain feels like a TENS unit when it contracts. Pain to this area wakes him up with spasms. He is unable to perform core exercises. Pain intense with walking, sweeping, mopping, sitting too long and standing too long.   Pain raidates to bilateral low back, bilateral buttocks, bilateral thighs to lateral aspect to bilateral ankles and bilateral feet numb on bottom with intermittent numbness to toes. Feels like sharp stabbing pain.  He underwent right T10-12 medial branch blocks a couple weeks ago. He no longer has the stabbing pain, but it has become dull. He is still having some axial back pain just below the levels of the MBB that were done, in the upper lumbar spine.      Back Pain  Associated symptoms include leg pain and numbness.   Low-back Pain  Associated symptoms include leg pain and numbness.   Mid-back Pain  Associated symptoms include leg pain and numbness.       Review of  Systems     Review of Systems   Musculoskeletal:  Positive for back pain and leg pain.   Neurological:  Positive for numbness.   All other systems reviewed and are negative.       Medical / Social / Family History     Past Medical History:   Diagnosis Date    Arthritis     High cholesterol     Kidney stones     Osteoporosis     Personal history of colonic polyps     Spinal stenosis     thoracic    Urinary retention     after anesthesia       Past Surgical History:   Procedure Laterality Date    COLONOSCOPY N/A 12/06/2022    Varghese Padilla    CYSTOSCOPY W/ LASER LITHOTRIPSY      EPIDURAL STEROID INJECTION INTO THORACIC SPINE N/A 03/20/2023    Procedure: INJECTION, STEROID, SPINE, THORACIC, EPIDURAL T10-11 interlaminar;  Surgeon: Autumn Buckley MD;  Location: [facility] OR;  Service: Pain Management;  Laterality: N/A;  T10-11 interlaminar    INJECTION OF ANESTHETIC AGENT AROUND MEDIAL BRANCH NERVES INNERVATING CERVICAL FACET JOINT Right 11/16/2023    Procedure: BLOCK, NERVE, FACET JOINT, CERVICAL, MEDIAL BRANCH Right MBB T10-12;  Surgeon: Autumn Buckley MD;  Location: [facility] OR;  Service: Pain Management;  Laterality: Right;    spinal injections      SPINE SURGERY      fusion 2 levels with rods and screws, anterior approach    TONSILLECTOMY      turbinate reduction         Social History  Mr. Ferrell  reports that he quit smoking about 13 years ago. His smoking use included cigarettes. He has never used smokeless tobacco. He reports current alcohol use of about 7.0 standard drinks of alcohol per week. He reports that he does not currently use drugs after having used the following drugs: Marijuana.    Family History  Mr.'s Ferrell family history includes Breast cancer in his mother; Colon cancer in his father; Coronary artery disease in his father; Prostate cancer in his father.    Medications and Allergies     Medications  Outpatient Medications Marked as Taking for the 1/11/24 encounter (Office Visit) with Marcio  Autumn FLORES MD   Medication Sig Dispense Refill    acetaminophen (TYLENOL) 650 MG TbSR Take 650 mg by mouth 2 (two) times daily.      atorvastatin (LIPITOR) 40 MG tablet Take 40 mg by mouth once daily.      baclofen (LIORESAL) 10 MG tablet Take 10 mg by mouth 2 (two) times daily.      co-enzyme Q-10 30 mg capsule Take 30 mg by mouth once daily.      gabapentin (NEURONTIN) 800 MG tablet Take 800 mg by mouth 3 (three) times daily.      loratadine-pseudoephedrine 5-120 mg (CLARITIN-D 12-HOUR) 5-120 mg per tablet Take 1 tablet by mouth daily as needed.      nabumetone (RELAFEN) 500 MG tablet Take 750 mg by mouth 2 (two) times daily as needed for Pain.      PROLIA 60 mg/mL Syrg Inject 60 mg into the skin every 6 (six) months.      tamsulosin (FLOMAX) 0.4 mg Cap Take 1 capsule by mouth once daily.      vitamin D (VITAMIN D3) 1000 units Tab Take 5,000 Units by mouth 3 (three) times a week.         Allergies  Review of patient's allergies indicates:  No Known Allergies    Physical Examination     Vitals:    01/11/24 0925   BP: 121/83   Pulse: 77   Temp: 98.4 °F (36.9 °C)     Spine Musculoskeletal Exam    Gait    Gait is normal.    Inspection    Thoracolumbar        Prior incision: lateral lumbar    Incision: clean, dry, intact and well-healed    Incisional drainage: none    Palpation    Thoracolumbar    Tenderness: present      Paraspinous: right    Right      Masses: none      Spasms: none      Crepitus: none      Muscle tone: increased    Left      Masses: none      Spasms: none      Crepitus: none      Muscle tone: normal    Range of Motion    Thoracolumbar        Thoracolumbar range of motion additional comments: Restricted range of motion in the lumbar spine secondary to pain.  Positive facet loading.    Strength    Thoracolumbar    Thoracolumbar motor exam is normal.       Sensory    Thoracolumbar    Thoracolumbar sensation is normal.    General      Constitutional: appears stated age, well-developed and well-nourished     Scleral icterus: no    Labored breathing: no    Psychiatric: normal mood and affect and no acute distress    Neurological: alert and oriented x3    Skin: intact    Lymphadenopathy: none  CV: extremities warm, well-perfused  Resp: nonlabored breathing      Assessment and Plan (including Health Maintenance)      Problem List  Smart Sets  Document Outside HM   :    Plan:   Lumbar spondylosis    Chronic back pain greater than 3 months duration       We will proceed with right L1 and 2 diagnostic medial branch blocks next week as scheduled.  He does not wish to have sedation for this.    Problem List Items Addressed This Visit       Lumbar spondylosis - Primary    Chronic back pain greater than 3 months duration         Future Appointments   Date Time Provider Department Center   2/2/2024  8:30 AM Seamus Fortune NP [facility]   5/2/2024  8:40 AM Anshul Ly MD [place] [id number]        There are no Patient Instructions on file for this visit.  No follow-ups on file.     Signature:  Autumn Buckley MD      Date of encounter: 1/11/24

## 2024-01-11 NOTE — PROGRESS NOTES
Autumn Buckley MD        PATIENT NAME: Jaxson Ferrell  : 1960  DATE: 24  MRN: [id number]      Billing Provider: Autumn Buckley MD  Level of Service:   Patient PCP Information       Provider PCP Type    Anshul East MD General            Reason for Visit / Chief Complaint: Back Pain (Pre op MBB L1-L2 24 C/O pain level 7,not taking pain meds.)       Update PCP  Update Chief Complaint         History of Present Illness / Problem Focused Workflow     Jaxson Ferrell presents to the clinic with Back Pain (Pre op MBB L1-L2 24 C/O pain level 7,not taking pain meds.)     Patient presents for f/u of degenerative disc disease, thoracic and lumbar .  On 2019 patient underwent removal of L5-S1 instrumentation with L4-5 and L5-S1 a TLIF with pedicle screws.  He had stabilization with his symptoms after surgery.  He returned to Dr. Rivas at the end of  due to increased back pain and notable right leg weakness.    Primary pain located to right low thoracic with spread on occasion to left side . States pain feels like a TENS unit when it contracts. Pain to this area wakes him up with spasms. He is unable to perform core exercises. Pain intense with walking, sweeping, mopping, sitting too long and standing too long.   Pain raidates to bilateral low back, bilateral buttocks, bilateral thighs to lateral aspect to bilateral ankles and bilateral feet numb on bottom with intermittent numbness to toes. Feels like sharp stabbing pain.  He underwent right T10-12 medial branch blocks a couple weeks ago. He no longer has the stabbing pain, but it has become dull. He is still having some axial back pain just below the levels of the MBB that were done, in the upper lumbar spine.      Back Pain  Associated symptoms include leg pain and numbness.   Low-back Pain  Associated symptoms include leg pain and numbness.   Mid-back Pain  Associated symptoms include leg pain and numbness.       Review of  Systems     Review of Systems   Musculoskeletal:  Positive for back pain and leg pain.   Neurological:  Positive for numbness.   All other systems reviewed and are negative.       Medical / Social / Family History     Past Medical History:   Diagnosis Date    Arthritis     High cholesterol     Kidney stones     Osteoporosis     Personal history of colonic polyps     Spinal stenosis     thoracic    Urinary retention     after anesthesia       Past Surgical History:   Procedure Laterality Date    COLONOSCOPY N/A 12/06/2022    Varghese Padilla    CYSTOSCOPY W/ LASER LITHOTRIPSY      EPIDURAL STEROID INJECTION INTO THORACIC SPINE N/A 03/20/2023    Procedure: INJECTION, STEROID, SPINE, THORACIC, EPIDURAL T10-11 interlaminar;  Surgeon: Autumn Buckley MD;  Location: [facility] OR;  Service: Pain Management;  Laterality: N/A;  T10-11 interlaminar    INJECTION OF ANESTHETIC AGENT AROUND MEDIAL BRANCH NERVES INNERVATING CERVICAL FACET JOINT Right 11/16/2023    Procedure: BLOCK, NERVE, FACET JOINT, CERVICAL, MEDIAL BRANCH Right MBB T10-12;  Surgeon: Autumn Buckley MD;  Location: [facility] OR;  Service: Pain Management;  Laterality: Right;    spinal injections      SPINE SURGERY      fusion 2 levels with rods and screws, anterior approach    TONSILLECTOMY      turbinate reduction         Social History  Mr. Ferrell  reports that he quit smoking about 13 years ago. His smoking use included cigarettes. He has never used smokeless tobacco. He reports current alcohol use of about 7.0 standard drinks of alcohol per week. He reports that he does not currently use drugs after having used the following drugs: Marijuana.    Family History  Mr.'s Ferrell family history includes Breast cancer in his mother; Colon cancer in his father; Coronary artery disease in his father; Prostate cancer in his father.    Medications and Allergies     Medications  Outpatient Medications Marked as Taking for the 1/11/24 encounter (Office Visit) with Marcio  Autumn FLORES MD   Medication Sig Dispense Refill    acetaminophen (TYLENOL) 650 MG TbSR Take 650 mg by mouth 2 (two) times daily.      atorvastatin (LIPITOR) 40 MG tablet Take 40 mg by mouth once daily.      baclofen (LIORESAL) 10 MG tablet Take 10 mg by mouth 2 (two) times daily.      co-enzyme Q-10 30 mg capsule Take 30 mg by mouth once daily.      gabapentin (NEURONTIN) 800 MG tablet Take 800 mg by mouth 3 (three) times daily.      loratadine-pseudoephedrine 5-120 mg (CLARITIN-D 12-HOUR) 5-120 mg per tablet Take 1 tablet by mouth daily as needed.      nabumetone (RELAFEN) 500 MG tablet Take 750 mg by mouth 2 (two) times daily as needed for Pain.      PROLIA 60 mg/mL Syrg Inject 60 mg into the skin every 6 (six) months.      tamsulosin (FLOMAX) 0.4 mg Cap Take 1 capsule by mouth once daily.      vitamin D (VITAMIN D3) 1000 units Tab Take 5,000 Units by mouth 3 (three) times a week.         Allergies  Review of patient's allergies indicates:  No Known Allergies    Physical Examination     Vitals:    01/11/24 0925   BP: 121/83   Pulse: 77   Temp: 98.4 °F (36.9 °C)     Spine Musculoskeletal Exam    Gait    Gait is normal.    Inspection    Thoracolumbar        Prior incision: lateral lumbar    Incision: clean, dry, intact and well-healed    Incisional drainage: none    Palpation    Thoracolumbar    Tenderness: present      Paraspinous: right    Right      Masses: none      Spasms: none      Crepitus: none      Muscle tone: increased    Left      Masses: none      Spasms: none      Crepitus: none      Muscle tone: normal    Range of Motion    Thoracolumbar        Thoracolumbar range of motion additional comments: Restricted range of motion in the lumbar spine secondary to pain.  Positive facet loading.    Strength    Thoracolumbar    Thoracolumbar motor exam is normal.       Sensory    Thoracolumbar    Thoracolumbar sensation is normal.    General      Constitutional: appears stated age, well-developed and well-nourished     Scleral icterus: no    Labored breathing: no    Psychiatric: normal mood and affect and no acute distress    Neurological: alert and oriented x3    Skin: intact    Lymphadenopathy: none  CV: extremities warm, well-perfused  Resp: nonlabored breathing      Assessment and Plan (including Health Maintenance)      Problem List  Smart Sets  Document Outside HM   :    Plan:   Lumbar spondylosis    Chronic back pain greater than 3 months duration       We will proceed with right L1 and 2 diagnostic medial branch blocks next week as scheduled.  He does not wish to have sedation for this.    Problem List Items Addressed This Visit       Lumbar spondylosis - Primary    Chronic back pain greater than 3 months duration         Future Appointments   Date Time Provider Department Center   2/2/2024  8:30 AM Seamus Fortune NP [facility]   5/2/2024  8:40 AM Anshul Ly MD [place] [address]        There are no Patient Instructions on file for this visit.  No follow-ups on file.     Signature:  Autumn Buckley MD      Date of encounter: 1/11/24

## 2024-01-16 ENCOUNTER — TELEPHONE (OUTPATIENT)
Dept: PAIN MEDICINE | Facility: CLINIC | Age: 64
End: 2024-01-16
Payer: OTHER GOVERNMENT

## 2024-01-16 NOTE — TELEPHONE ENCOUNTER
Pt contacted office stating he tested + for COVID 1/11/24, injection has been r/s to 2/01/24, per Dr Buckley pt must have a neg test to have injection, pt will test 1 week prior and contact office results will be scanned into chart.

## 2024-02-01 ENCOUNTER — HOSPITAL ENCOUNTER (OUTPATIENT)
Facility: HOSPITAL | Age: 64
Discharge: HOME | End: 2024-02-01
Attending: ANESTHESIOLOGY | Admitting: ANESTHESIOLOGY
Payer: OTHER GOVERNMENT

## 2024-02-01 DIAGNOSIS — M54.9 CHRONIC BACK PAIN GREATER THAN 3 MONTHS DURATION: ICD-10-CM

## 2024-02-01 DIAGNOSIS — G89.29 CHRONIC BACK PAIN GREATER THAN 3 MONTHS DURATION: ICD-10-CM

## 2024-02-01 PROCEDURE — 25000003 PHARM REV CODE 250: Performed by: ANESTHESIOLOGY

## 2024-02-01 PROCEDURE — 64493 INJ PARAVERT F JNT L/S 1 LEV: CPT | Mod: RT | Performed by: ANESTHESIOLOGY

## 2024-02-01 PROCEDURE — 63600175 PHARM REV CODE 636 W HCPCS: Mod: JZ,JG | Performed by: ANESTHESIOLOGY

## 2024-02-01 PROCEDURE — 64493 INJ PARAVERT F JNT L/S 1 LEV: CPT | Mod: RT,,, | Performed by: ANESTHESIOLOGY

## 2024-02-01 RX ORDER — LIDOCAINE HYDROCHLORIDE 10 MG/ML
INJECTION, SOLUTION EPIDURAL; INFILTRATION; INTRACAUDAL; PERINEURAL
Status: DISCONTINUED
Start: 2024-02-01 | End: 2024-02-01 | Stop reason: HOSPADM

## 2024-02-01 RX ORDER — LIDOCAINE HYDROCHLORIDE 10 MG/ML
INJECTION, SOLUTION EPIDURAL; INFILTRATION; INTRACAUDAL; PERINEURAL
Status: DISCONTINUED | OUTPATIENT
Start: 2024-02-01 | End: 2024-02-01 | Stop reason: HOSPADM

## 2024-02-01 RX ORDER — BUPIVACAINE HYDROCHLORIDE 2.5 MG/ML
INJECTION, SOLUTION EPIDURAL; INFILTRATION; INTRACAUDAL
Status: DISCONTINUED | OUTPATIENT
Start: 2024-02-01 | End: 2024-02-01 | Stop reason: HOSPADM

## 2024-02-01 NOTE — OP NOTE
Right L1 and L2 diagnsotic medial branch blocks    Pre-Procedure Diagnoses:  1. Chronic pain syndrome  2. Chronic Low back pain  3. Lumbar facet arthropathy    Post-Procedure Diagnoses:  1. Chronic pain syndrome  2. Chronic Low back pain  3. Lumbar facet arthropathy    Anesthesia:  Local only    Estimated Blood Loss:  None    Complications:  None    Informed Consent:  The procedure, risks, benefits, and alternatives were discussed with the patient. There were no contraindications to the procedure. The patient expressed understanding and agreed to proceed. Fully informed written consent was obtained.     Description of the Procedure:  The patient was taken to the operating room. IV access was obtained prior to the start of the procedure. The patient was positioned prone on the fluoroscopy table. Continuous hemodynamic monitoring was initiated and continued throughout the duration of the procedure. The skin overlying the lumbosacral spine was prepped with Chloraprep and draped into a sterile field. Fluoroscopy was used to identify the locations of the right side L1 and L2 medial branch nerves at the junctions of the superior articular processes and transverse processes of L2 and L3, respectively. Skin anesthesia was achieved using 0.5 mL of 1% lidocaine over each injection site. A 22-gauge 3.5-inch Quinke spinal needle was slowly inserted and advanced under intermittent fluoroscopic guidance until it made bony contact at the target sites. Proper needle position was confirmed under AP, oblique, and lateral fluoroscopic views. Negative aspiration for blood or CSF was confirmed. Then  0.5 mL of 0.25% bupivacaine was easily injected at each level. There was no pain on injection. The needles were removed intact and bleeding was nil. Sterile bandages were applied. The patient was taken to the recovery room for further observation in stable condition. The patient was then discharged home without any complications.

## 2024-02-01 NOTE — DISCHARGE SUMMARY
[facility] - Periop Services  Discharge Note  Short Stay    Procedure(s) (LRB):  BLOCK, NERVE, FACET JOINT, LUMBAR, MEDIAL BRANCH Right L1-2   Patient does not want sedation (Right)      OUTCOME: Patient tolerated treatment/procedure well without complication and is now ready for discharge.    DISPOSITION: Home or Self Care    FINAL DIAGNOSIS:  <principal problem not specified>    FOLLOWUP: In clinic    DISCHARGE INSTRUCTIONS:  No discharge procedures on file.     TIME SPENT ON DISCHARGE: 5 minutes

## 2024-02-06 VITALS
HEIGHT: 70 IN | SYSTOLIC BLOOD PRESSURE: 132 MMHG | OXYGEN SATURATION: 97 % | BODY MASS INDEX: 27.55 KG/M2 | HEART RATE: 68 BPM | TEMPERATURE: 98 F | WEIGHT: 192.44 LBS | RESPIRATION RATE: 18 BRPM | DIASTOLIC BLOOD PRESSURE: 69 MMHG

## 2024-02-15 ENCOUNTER — OFFICE VISIT (OUTPATIENT)
Dept: PAIN MEDICINE | Facility: CLINIC | Age: 64
End: 2024-02-15
Payer: OTHER GOVERNMENT

## 2024-02-15 VITALS
BODY MASS INDEX: 27.49 KG/M2 | HEART RATE: 68 BPM | SYSTOLIC BLOOD PRESSURE: 112 MMHG | WEIGHT: 192 LBS | DIASTOLIC BLOOD PRESSURE: 77 MMHG | HEIGHT: 70 IN

## 2024-02-15 DIAGNOSIS — M47.816 LUMBAR SPONDYLOSIS: ICD-10-CM

## 2024-02-15 DIAGNOSIS — M51.34 DEGENERATIVE DISC DISEASE, THORACIC: ICD-10-CM

## 2024-02-15 DIAGNOSIS — G89.29 CHRONIC BACK PAIN GREATER THAN 3 MONTHS DURATION: ICD-10-CM

## 2024-02-15 DIAGNOSIS — M47.814 THORACIC SPONDYLOSIS: Primary | ICD-10-CM

## 2024-02-15 DIAGNOSIS — M54.9 CHRONIC BACK PAIN GREATER THAN 3 MONTHS DURATION: ICD-10-CM

## 2024-02-15 DIAGNOSIS — M51.34 THORACIC DEGENERATIVE DISC DISEASE: ICD-10-CM

## 2024-02-15 PROCEDURE — 99214 OFFICE O/P EST MOD 30 MIN: CPT | Mod: ,,, | Performed by: NURSE PRACTITIONER

## 2024-02-15 NOTE — PROGRESS NOTES
Subjective:      Patient ID: Jaxson Ferrell is a 63 y.o. male.    Chief Complaint: Low-back Pain (Post op procedure 2/1/24 C/O pain level  7, states procedure helped but still has discomfort, Taking Gabapentin for pain.)    Referred by: No ref. provider found     HPI this is a pleasant 63-year-old male patient presenting as a follow-up for pain associated with lumbar spondylosis after receiving a right diagnostic medial branch block L1-2 on 02/01/2024.  Reported notable temporary pain relief greater than 80% to lumbar axial spine for a couple of days.  During that time he had notable reduction of pain to the right axial lumbar spine  with prolonged standing, twisting at the waist and egress from sitting to standing.  Prior to this block those activities would elevate his pain score to a 7-10/10.  His pain has returned in that area.  He also has a return of pain to his right lower thoracic back.  The pain is intense with walking, sweeping, mopping, sitting and standing too long.    He underwent right T10-12 medial branch blocks a few  weeks ago and received greater than 80% relief pain for a couple of days.  This pain has returned and is now waking him up in his intense with walking, sweeping, mopping, sitting too long and standing too long.      Patient originally presented as a new consult on 02/24/2023 for pain associated with degenerative disc disease, thoracic and lumbar region      PMH:  Lumbar radiculitis, Lumbar spondylosis,Spinal stenosis, osteoarthritis, kidney stones, lumbar neuritis/radiculitis, lumbar spondylosis, failed back syndrome lumbar region, cervical spondylosis without myelopathy, pelvic/thigh/hip degenerative joint disease, shoulder DJD, lumbar disc degeneration, cervical disc degeneration, patellar subluxation, chronic bilateral knee pain, ulnar neuropathy with degeneration of nerve, osteoporosis, arthrosis after fusion, bilateral lower extremity edema, spondylolisthesis L4-L5 , disc  "degeneration thoracic region, disc degeneration lumbar region, bilateral carpal tunnel syndrome, lumbar stenosis with neurogenic claudication, low back pain, cervical pain, cervical spondylosis with radiculopathy, vascular disease with venous insufficiency, arthritis, hypercholesterolemia, kidney stones,    PSH:  Fusion of spine, On 03/24/2019 patient underwent removal of L5-S1 instrumentation with L4-5 and L5-S1 a TLIF with pedicle screws. He had stabilization with his symptoms after surgery. He returned to Dr. Rivas at the end of 2021 due to increased back pain and notable right leg weakness.   kidney stone removal, sinus surgery Prevous surgery March 2010 L5/S1 that failed.   FH: cancer: Mother and father  Social history:  Prior smoker.  Drinks alcohol.  Negative illicit drug use.  Negative heavy alcohol use.    Vital signs:   Vitals:    02/15/24 0842 02/15/24 0844   BP: 112/77    Pulse: 68    Weight: 87.1 kg (192 lb)    Height: 5' 10" (1.778 m)    PainSc:    7     Body mass index is 27.55 kg/m².  Pain Disability Index (PDI): 44       Interventional Pain History  02/01/2024:  Right diagnostic MBB L1-2  11/16/2023:  Right diagnostic MBB T10-12     MRI lumbar spine 03/28/2022:    Alignment:  Normal, lordosis.  No scoliosis.  Grade 1 anterior listhesis of L4 on L5.    Soft tissues:  Postsurgical changes dorsally.  Otherwise no signal abnormalities.  Posterior paraspinal muscles: Fatty atrophy.  Intramuscular edema on the left at the lumbosacral junction suggest injury/strain.  Otherwise no signal abnormalities.    Conus medullaris:  Normal ends at L1   Cauda equina:  No masses or arachnoiditis.      Vertebrae:  No fractures, infection or neoplasm.  Status post posterior fusion and decompression from L4 through S1 with bilateral stabilizing rods, trans pedicular screws, L4-5 interbody fusion device, L5-S1 disc spacer, left L4 hemilaminectomy and L5-S1 decompressive laminectomies.    Degenerative changes:      L1-L2 " facet hypertrophy.  No significant canal or foraminal narrowing.      L2-L3:  Facet hypertrophy.  No significant canal or foraminal narrowing.      L3-L4: Mild canal and bilateral foraminal narrowing related to symmetric disc bulge and facet hypertrophy.      L4-L5: Postsurgical changes.    No significant canal or foraminal narrowing.      L5-S1:  Postsurgical changes   No significant canal or foraminal narrowing.      Incidental findings: Right renal lower pole nonobstructive stone.    Small bilateral renal cysts.      IMPRESSION:   Postsurgical spine   Mild degenerative canal and bilateral neuroforaminal stenosis at L3-4.    No disc herniation  The study does not show cord compression .  Facet hypertrophy at T11-12, T12-L1.  No foraminal stenosis at L4-5 or L5-S1     ROS right-sided low back pain    MRI lumbar spine 2022:  DISCUSSION:     Number of non-rib bearing lumbar vertebral bodies: 5.     Alignment: Normal lordosis. No scoliosis. Grade 1 anterolisthesis of  L4 on L5.  Soft tissues: Postsurgical changes dorsally. Otherwise no signal  abnormalities..  Posterior paraspinal muscles: Fatty atrophy. Intramuscular edema on  the left at the lumbosacral junction suggests injury/strain. Otherwise  no signal abnormalities.  Conus medullaris: Normal, ends at L1.  Cauda equina: No masses or arachnoiditis.     Vertebrae:   No fractures, infection or neoplasm.  Status post posterior fusion and decompression from L4 through S1 with  bilateral stabilizing rods, transpedicular screws, L4-5 interbody  fusion device, L5-S1 disc spacer, left L4 hemilaminotomy, and L5-S1  decompressive laminectomies.     Degenerative changes:     L1-L2:   Facet hypertrophy.  No significant canal or foraminal narrowing.     L2-L3:   Facet hypertrophy.  No significant canal or foraminal narrowing.     L3-L4:  Mild canal and bilateral foraminal narrowing related to symmetric disc  bulge and facet hypertrophy.     L4-L5:   Postsurgical changes.  No  significant canal or foraminal narrowing.     L5-S1:   Postsurgical changes.  No significant canal or foraminal narrowing.     Incidental findings:  Right renal lower pole nonobstructive stone.  Small bilateral renal cysts.     IMPRESSION:      1. Postsurgical spine.     2. Mild degenerative canal and bilateral neuroforaminal stenoses at  L3-4.     3. No disc herniations.  Electronically Signed By: Karoline PEDERSON, Elvis Cain        Objective:          Physical Exam    General: Well developed normal weight.  A&O x 3; No anxiety/depression; NAD  Mental Status: Oriented to person, palce and time. Displays appropriate mood & affect.  Head: Norm cephalic and atraumatic  Neck: Midline trachea  Eyes: normal conjunctiva, normal lids, normal pupils  ENT and mouth: normal external ear, nose, and no lesions noted on the lips.  Respiratory: Symmetrical, Unlabored. No dyspnea  CV: No peripheral edema.   Abdomen: Non-distended     Extremities:  Gen: No cyanosis or tenderness to palpation bilateral upper and lower extremities  Skin: Warm, pink, dry, no rashes, no lesions on the lumbar spine  Strength: 4/5 motor strength bilateral upper and lower  ROM: Full ROM in bilateral knees  without pain or instability.     Neuro:  Gait: Guarded gait. Indendant ambulator. no altalgic lean, normal toe and heel raise  DTR's: 2+ in bilateral patellar,   Sensory:  bilteral arm and hand numbness R>L  Bilateal feet numb.   Thoric and Lumbar Spine: Normal lordosis. No scoliosis  L-spine and T spine ROM: Painful with , extension, bilateral rotation, Normal flexion  Straight Leg Raise: + bilaterally   SI Joint: + tenderness bilateral            Assessment:     Assessment:  This is a pleasant 63-year-old male patient presenting as a follow-up for pain associated with lumbar spondylosis after receiving a right diagnostic medial branch block L1-2 on 02/01/2024.  Reported notable temporary pain relief greater than 80% to lumbar axial spine for a couple of  days.  During that time he had notable reduction of pain to the right axial lumbar spine  with prolonged standing, twisting at the waist and egress from sitting to standing.  Prior to this block those activities would elevate his pain score to a 7-10/10.  His pain has returned in that area.  He also has a return of pain to his right lower thoracic back.  The pain is intense with walking, sweeping, mopping, sitting and standing too long.    He underwent right T10-12 medial branch blocks a few  weeks ago and received greater than 80% relief pain for a couple of days.  This pain has returned and is now waking him up in his intense with walking, sweeping, mopping, sitting too long and standing too long.      Patient originally presented as a new consult on 02/24/2023 for pain associated with degenerative disc disease, thoracic and lumbar region      Plan of care:  Request sent for 2nd Right diagnostic MBB T10-12  Hold CoQ10 7 days before and resume after.   Patient relayed he did not receive an IV during his last interventional pain [id number]/01/2024:  Right diagnostic MBB L1-2    Encounter Diagnoses   Name Primary?    Lumbar spondylosis Yes    Thoracic spondylosis          Plan:       Jaxson was seen today for low-back pain.    Diagnoses and all orders for this visit:    Lumbar spondylosis    Thoracic spondylosis             Past Medical History:   Diagnosis Date    Arthritis     High cholesterol     Kidney stones     Osteoporosis     Personal history of colonic polyps     Spinal stenosis     thoracic    Urinary retention     after anesthesia       Past Surgical History:   Procedure Laterality Date    COLONOSCOPY N/A 12/06/2022    [facility]    CYSTOSCOPY W/ LASER LITHOTRIPSY      EPIDURAL STEROID INJECTION INTO THORACIC SPINE N/A 03/20/2023    Procedure: INJECTION, STEROID, SPINE, THORACIC, EPIDURAL T10-11 interlaminar;  Surgeon: Autumn Buckley MD;  Location: [facility];  Service: Pain Management;  Laterality:  N/A;  T10-11 interlaminar    INJECTION OF ANESTHETIC AGENT AROUND MEDIAL BRANCH NERVES INNERVATING CERVICAL FACET JOINT Right 2023    Procedure: BLOCK, NERVE, FACET JOINT, CERVICAL, MEDIAL BRANCH Right MBB T10-12;  Surgeon: Autumn Buckley MD;  Location: [facility] OR;  Service: Pain Management;  Laterality: Right;    INJECTION OF ANESTHETIC AGENT AROUND MEDIAL BRANCH NERVES INNERVATING LUMBAR FACET JOINT Right 2024    Procedure: BLOCK, NERVE, FACET JOINT, LUMBAR, MEDIAL BRANCH Right L1-2   Patient does not want sedation;  Surgeon: Autumn Buckley MD;  Location: [facility] OR;  Service: Pain Management;  Laterality: Right;  Right MBB L1-2    spinal injections      SPINE SURGERY      fusion 2 levels with rods and screws, anterior approach    TONSILLECTOMY      turbinate reduction         Family History   Problem Relation Age of Onset    Breast cancer Mother     Coronary artery disease Father     Colon cancer Father     Prostate cancer Father        Social History     Socioeconomic History    Marital status: Single   Tobacco Use    Smoking status: Former     Current packs/day: 0.00     Types: Cigarettes     Quit date: 3/2/2010     Years since quittin.9    Smokeless tobacco: Never   Substance and Sexual Activity    Alcohol use: Yes     Alcohol/week: 4.0 standard drinks of alcohol     Types: 4 Glasses of wine per week     Comment: WINE IN EVENING    Drug use: Not Currently    Sexual activity: Yes     Partners: Female     Social Determinants of Health     Financial Resource Strain: Low Risk  (2023)    Overall Financial Resource Strain (CARDIA)     Difficulty of Paying Living Expenses: Not hard at all   Food Insecurity: No Food Insecurity (2023)    Hunger Vital Sign     Worried About Running Out of Food in the Last Year: Never true     Ran Out of Food in the Last Year: Never true   Transportation Needs: No Transportation Needs (2023)    PRAPARE - Transportation     Lack of Transportation (Medical):  No     Lack of Transportation (Non-Medical): No   Physical Activity: Sufficiently Active (4/25/2023)    Exercise Vital Sign     Days of Exercise per Week: 5 days     Minutes of Exercise per Session: 40 min   Stress: No Stress Concern Present (4/25/2023)    [employer] - Occupational Stress Questionnaire     Feeling of Stress : Not at all   Social Connections: Unknown (4/25/2023)    Social Connection and Isolation Panel [NHANES]     Frequency of Communication with Friends and Family: More than three times a week     Frequency of Social Gatherings with Friends and Family: More than three times a week     Active Member of Clubs or Organizations: Yes     Attends Club or Organization Meetings: More than 4 times per year     Marital Status:    Housing Stability: Unknown (4/25/2023)    Housing Stability Vital Sign     Unable to Pay for Housing in the Last Year: No     Unstable Housing in the Last Year: No       Current Outpatient Medications   Medication Sig Dispense Refill    acetaminophen (TYLENOL) 650 MG TbSR Take 650 mg by mouth 2 (two) times daily.      atorvastatin (LIPITOR) 40 MG tablet Take 40 mg by mouth once daily.      baclofen (LIORESAL) 10 MG tablet Take 10 mg by mouth 2 (two) times daily.      co-enzyme Q-10 30 mg capsule Take 30 mg by mouth once daily.      gabapentin (NEURONTIN) 800 MG tablet Take 800 mg by mouth 3 (three) times daily.      loratadine-pseudoephedrine 5-120 mg (CLARITIN-D 12-HOUR) 5-120 mg per tablet Take 1 tablet by mouth daily as needed.      nabumetone (RELAFEN) 500 MG tablet Take 750 mg by mouth 2 (two) times daily as needed for Pain.      PROLIA 60 mg/mL Syrg Inject 60 mg into the skin every 6 (six) months.      tamsulosin (FLOMAX) 0.4 mg Cap Take 1 capsule by mouth once daily.      vitamin D (VITAMIN D3) 1000 units Tab Take 5,000 Units by mouth 3 (three) times a week.       No current facility-administered medications for this visit.       Review of  patient's allergies indicates:  No Known Allergies

## 2024-03-18 ENCOUNTER — HOSPITAL ENCOUNTER (OUTPATIENT)
Facility: HOSPITAL | Age: 64
Discharge: HOME | End: 2024-03-18
Attending: ANESTHESIOLOGY | Admitting: ANESTHESIOLOGY
Payer: OTHER GOVERNMENT

## 2024-03-18 VITALS
WEIGHT: 195.75 LBS | SYSTOLIC BLOOD PRESSURE: 137 MMHG | HEIGHT: 70 IN | DIASTOLIC BLOOD PRESSURE: 82 MMHG | HEART RATE: 72 BPM | BODY MASS INDEX: 28.02 KG/M2 | TEMPERATURE: 98 F | OXYGEN SATURATION: 100 % | RESPIRATION RATE: 18 BRPM

## 2024-03-18 DIAGNOSIS — G89.29 CHRONIC BACK PAIN GREATER THAN 3 MONTHS DURATION: ICD-10-CM

## 2024-03-18 DIAGNOSIS — M54.9 CHRONIC BACK PAIN GREATER THAN 3 MONTHS DURATION: ICD-10-CM

## 2024-03-18 PROCEDURE — 25000003 PHARM REV CODE 250: Performed by: ANESTHESIOLOGY

## 2024-03-18 PROCEDURE — 64490 INJ PARAVERT F JNT C/T 1 LEV: CPT | Mod: RT,,, | Performed by: ANESTHESIOLOGY

## 2024-03-18 PROCEDURE — 64490 INJ PARAVERT F JNT C/T 1 LEV: CPT | Mod: RT | Performed by: ANESTHESIOLOGY

## 2024-03-18 PROCEDURE — 63600175 PHARM REV CODE 636 W HCPCS: Mod: JZ,JG | Performed by: ANESTHESIOLOGY

## 2024-03-18 RX ORDER — SODIUM CHLORIDE 9 MG/ML
INJECTION, SOLUTION INTRAVENOUS CONTINUOUS
Status: DISCONTINUED | OUTPATIENT
Start: 2024-03-18 | End: 2024-03-18 | Stop reason: HOSPADM

## 2024-03-18 RX ORDER — LIDOCAINE HYDROCHLORIDE 10 MG/ML
INJECTION, SOLUTION EPIDURAL; INFILTRATION; INTRACAUDAL; PERINEURAL
Status: DISCONTINUED | OUTPATIENT
Start: 2024-03-18 | End: 2024-03-18 | Stop reason: HOSPADM

## 2024-03-18 RX ORDER — LIDOCAINE HYDROCHLORIDE 10 MG/ML
1 INJECTION, SOLUTION EPIDURAL; INFILTRATION; INTRACAUDAL; PERINEURAL ONCE AS NEEDED
Status: DISCONTINUED | OUTPATIENT
Start: 2024-03-18 | End: 2024-03-18 | Stop reason: HOSPADM

## 2024-03-18 RX ORDER — BUPIVACAINE HYDROCHLORIDE 2.5 MG/ML
INJECTION, SOLUTION EPIDURAL; INFILTRATION; INTRACAUDAL
Status: DISCONTINUED | OUTPATIENT
Start: 2024-03-18 | End: 2024-03-18 | Stop reason: HOSPADM

## 2024-03-18 RX ORDER — LIDOCAINE HYDROCHLORIDE 10 MG/ML
INJECTION, SOLUTION EPIDURAL; INFILTRATION; INTRACAUDAL; PERINEURAL
Status: DISCONTINUED
Start: 2024-03-18 | End: 2024-03-18 | Stop reason: HOSPADM

## 2024-03-18 NOTE — OP NOTE
Right T10-12 diagnostic medial branch blocks    Pre-Procedure Diagnoses:  1. Chronic pain syndrome  2. Chronic mid back pain  3. Thoracic facet arthropathy    Post-Procedure Diagnoses:  1. Chronic pain syndrome  2. Chronic mid back pain  3. Thoracic facet arthropathy    Anesthesia:  Local only    Estimated Blood Loss:  None    Complications:  None    Informed Consent:  The procedure, risks, benefits, and alternatives were discussed with the patient. There were no contraindications to the procedure. The patient expressed understanding and agreed to proceed. Fully informed written consent was obtained.     Description of the Procedure:  The patient was taken to the operating room. IV access was obtained prior to the start of the procedure. The patient was positioned prone on the fluoroscopy table. Continuous hemodynamic monitoring was initiated and continued throughout the duration of the procedure. The skin overlying the lumbosacral spine was prepped with Chloraprep and draped into a sterile field. Fluoroscopy was used to identify the locations of the right side T10, T11, and T12 medial branch nerves.  Skin anesthesia was achieved using 0.5 mL of 1% lidocaine over each injection site. A 25-gauge 3.5-inch Quinke spinal needle was slowly inserted and advanced under intermittent fluoroscopic guidance until it made bony contact at the target sites. Proper needle position was confirmed under AP, oblique, and lateral fluoroscopic views. Negative aspiration for blood or CSF was confirmed. Then 0.5 mL of 0.25% bupivacaine was easily injected at each level. There was no pain on injection. The needles were removed intact and bleeding was nil. Sterile bandages were applied. The patient was taken to the recovery room for further observation in stable condition. The patient was then discharged home without any complications.

## 2024-03-18 NOTE — PLAN OF CARE
Discharge criteria met per Nidhi and MD.  Will discharge patient to home. Patient ambulated to car with staff.

## 2024-03-18 NOTE — H&P
Subjective   Patient ID: Jaxson Ferrell is a 63 y.o. male.     Chief Complaint: Low-back Pain (Post op procedure 2/1/24 C/O pain level  7, states procedure helped but still has discomfort, Taking Gabapentin for pain.)     Referred by: No ref. provider found      HPI this is a pleasant 63-year-old male patient presenting as a follow-up for pain associated with lumbar spondylosis after receiving a right diagnostic medial branch block L1-2 on 02/01/2024.  Reported notable temporary pain relief greater than 80% to lumbar axial spine for a couple of days.  During that time he had notable reduction of pain to the right axial lumbar spine  with prolonged standing, twisting at the waist and egress from sitting to standing.  Prior to this block those activities would elevate his pain score to a 7-10/10.  His pain has returned in that area.  He also has a return of pain to his right lower thoracic back.  The pain is intense with walking, sweeping, mopping, sitting and standing too long.     He underwent right T10-12 medial branch blocks a few  weeks ago and received greater than 80% relief pain for a couple of days.  This pain has returned and is now waking him up in his intense with walking, sweeping, mopping, sitting too long and standing too long.       Patient originally presented as a new consult on 02/24/2023 for pain associated with degenerative disc disease, thoracic and lumbar region        PMH:  Lumbar radiculitis, Lumbar spondylosis,Spinal stenosis, osteoarthritis, kidney stones, lumbar neuritis/radiculitis, lumbar spondylosis, failed back syndrome lumbar region, cervical spondylosis without myelopathy, pelvic/thigh/hip degenerative joint disease, shoulder DJD, lumbar disc degeneration, cervical disc degeneration, patellar subluxation, chronic bilateral knee pain, ulnar neuropathy with degeneration of nerve, osteoporosis, arthrosis after fusion, bilateral lower extremity edema, spondylolisthesis L4-L5 , disc  "degeneration thoracic region, disc degeneration lumbar region, bilateral carpal tunnel syndrome, lumbar stenosis with neurogenic claudication, low back pain, cervical pain, cervical spondylosis with radiculopathy, vascular disease with venous insufficiency, arthritis, hypercholesterolemia, kidney stones,    PSH:  Fusion of spine, On 03/24/2019 patient underwent removal of L5-S1 instrumentation with L4-5 and L5-S1 a TLIF with pedicle screws. He had stabilization with his symptoms after surgery. He returned to Dr. Rivas at the end of 2021 due to increased back pain and notable right leg weakness.   kidney stone removal, sinus surgery Prevous surgery March 2010 L5/S1 that failed.   FH: cancer: Mother and father  Social history:  Prior smoker.  Drinks alcohol.  Negative illicit drug use.  Negative heavy alcohol use.     Vital signs:        Vitals:     02/15/24 0842 02/15/24 0844   BP: 112/77     Pulse: 68     Weight: 87.1 kg (192 lb)     Height: 5' 10" (1.778 m)     PainSc:     7      Body mass index is 27.55 kg/m².  Pain Disability Index (PDI): 44     Interventional Pain History  02/01/2024:  Right diagnostic MBB L1-2  11/16/2023:  Right diagnostic MBB T10-12     MRI lumbar spine 03/28/2022:    Alignment:  Normal, lordosis.  No scoliosis.  Grade 1 anterior listhesis of L4 on L5.    Soft tissues:  Postsurgical changes dorsally.  Otherwise no signal abnormalities.  Posterior paraspinal muscles: Fatty atrophy.  Intramuscular edema on the left at the lumbosacral junction suggest injury/strain.  Otherwise no signal abnormalities.    Conus medullaris:  Normal ends at L1   Cauda equina:  No masses or arachnoiditis.      Vertebrae:  No fractures, infection or neoplasm.  Status post posterior fusion and decompression from L4 through S1 with bilateral stabilizing rods, trans pedicular screws, L4-5 interbody fusion device, L5-S1 disc spacer, left L4 hemilaminectomy and L5-S1 decompressive laminectomies.    Degenerative changes: "      L1-L2 facet hypertrophy.  No significant canal or foraminal narrowing.      L2-L3:  Facet hypertrophy.  No significant canal or foraminal narrowing.      L3-L4: Mild canal and bilateral foraminal narrowing related to symmetric disc bulge and facet hypertrophy.      L4-L5: Postsurgical changes.    No significant canal or foraminal narrowing.      L5-S1:  Postsurgical changes   No significant canal or foraminal narrowing.      Incidental findings: Right renal lower pole nonobstructive stone.    Small bilateral renal cysts.      IMPRESSION:   Postsurgical spine   Mild degenerative canal and bilateral neuroforaminal stenosis at L3-4.    No disc herniation  The study does not show cord compression .  Facet hypertrophy at T11-12, T12-L1.  No foraminal stenosis at L4-5 or L5-S1     ROS right-sided low back pain     MRI lumbar spine 2022:  DISCUSSION:     Number of non-rib bearing lumbar vertebral bodies: 5.     Alignment: Normal lordosis. No scoliosis. Grade 1 anterolisthesis of  L4 on L5.  Soft tissues: Postsurgical changes dorsally. Otherwise no signal  abnormalities..  Posterior paraspinal muscles: Fatty atrophy. Intramuscular edema on  the left at the lumbosacral junction suggests injury/strain. Otherwise  no signal abnormalities.  Conus medullaris: Normal, ends at L1.  Cauda equina: No masses or arachnoiditis.     Vertebrae:   No fractures, infection or neoplasm.  Status post posterior fusion and decompression from L4 through S1 with  bilateral stabilizing rods, transpedicular screws, L4-5 interbody  fusion device, L5-S1 disc spacer, left L4 hemilaminotomy, and L5-S1  decompressive laminectomies.     Degenerative changes:     L1-L2:   Facet hypertrophy.  No significant canal or foraminal narrowing.     L2-L3:   Facet hypertrophy.  No significant canal or foraminal narrowing.     L3-L4:  Mild canal and bilateral foraminal narrowing related to symmetric disc  bulge and facet hypertrophy.     L4-L5:   Postsurgical  changes.  No significant canal or foraminal narrowing.     L5-S1:   Postsurgical changes.  No significant canal or foraminal narrowing.     Incidental findings:  Right renal lower pole nonobstructive stone.  Small bilateral renal cysts.     IMPRESSION:      1. Postsurgical spine.     2. Mild degenerative canal and bilateral neuroforaminal stenoses at  L3-4.     3. No disc herniations.  Electronically Signed By: Karoline PEDERSON, Elvis Cain           Objective:      Objective   Physical Exam      General: Well developed normal weight.  A&O x 3; No anxiety/depression; NAD  Mental Status: Oriented to person, palce and time. Displays appropriate mood & affect.  Head: Norm cephalic and atraumatic  Neck: Midline trachea  Eyes: normal conjunctiva, normal lids, normal pupils  ENT and mouth: normal external ear, nose, and no lesions noted on the lips.  Respiratory: Symmetrical, Unlabored. No dyspnea  CV: No peripheral edema.   Abdomen: Non-distended     Extremities:  Gen: No cyanosis or tenderness to palpation bilateral upper and lower extremities  Skin: Warm, pink, dry, no rashes, no lesions on the lumbar spine  Strength: 4/5 motor strength bilateral upper and lower  ROM: Full ROM in bilateral knees  without pain or instability.     Neuro:  Gait: Guarded gait. Indendant ambulator. no altalgic lean, normal toe and heel raise  DTR's: 2+ in bilateral patellar,   Sensory:  bilteral arm and hand numbness R>L  Bilateal feet numb.   Thoric and Lumbar Spine: Normal lordosis. No scoliosis  L-spine and T spine ROM: Painful with , extension, bilateral rotation, Normal flexion  Straight Leg Raise: + bilaterally   SI Joint: + tenderness bilateral            Assessment:      Assessment   Assessment:  This is a pleasant 63-year-old male patient presenting as a follow-up for pain associated with lumbar spondylosis after receiving a right diagnostic medial branch block L1-2 on 02/01/2024.  Reported notable temporary pain relief greater than 80%  to lumbar axial spine for a couple of days.  During that time he had notable reduction of pain to the right axial lumbar spine  with prolonged standing, twisting at the waist and egress from sitting to standing.  Prior to this block those activities would elevate his pain score to a 7-10/10.  His pain has returned in that area.  He also has a return of pain to his right lower thoracic back.  The pain is intense with walking, sweeping, mopping, sitting and standing too long.     He underwent right T10-12 medial branch blocks a few  weeks ago and received greater than 80% relief pain for a couple of days.  This pain has returned and is now waking him up in his intense with walking, sweeping, mopping, sitting too long and standing too long.       Patient originally presented as a new consult on 02/24/2023 for pain associated with degenerative disc disease, thoracic and lumbar region        Plan of care:  Request sent for 2nd Right diagnostic MBB T10-12  Hold CoQ10 7 days before and resume after.   Patient relayed he did not receive an IV during his last interventional pain [id number]/01/2024:  Right diagnostic MBB L1-2          Encounter Diagnoses   Name Primary?    Lumbar spondylosis Yes    Thoracic spondylosis              Plan:      Plan   Jaxson was seen today for low-back pain.     Diagnoses and all orders for this visit:     Lumbar spondylosis     Thoracic spondylosis                      Past Medical History:   Diagnosis Date    Arthritis      High cholesterol      Kidney stones      Osteoporosis      Personal history of colonic polyps      Spinal stenosis       thoracic    Urinary retention       after anesthesia               Past Surgical History:   Procedure Laterality Date    COLONOSCOPY N/A 12/06/2022     [facility]    CYSTOSCOPY W/ LASER LITHOTRIPSY        EPIDURAL STEROID INJECTION INTO THORACIC SPINE N/A 03/20/2023     Procedure: INJECTION, STEROID, SPINE, THORACIC, EPIDURAL T10-11 interlaminar;   Surgeon: Autumn Buckley MD;  Location: [facility] OR;  Service: Pain Management;  Laterality: N/A;  T10-11 interlaminar    INJECTION OF ANESTHETIC AGENT AROUND MEDIAL BRANCH NERVES INNERVATING CERVICAL FACET JOINT Right 2023     Procedure: BLOCK, NERVE, FACET JOINT, CERVICAL, MEDIAL BRANCH Right MBB T10-12;  Surgeon: Autumn Buckley MD;  Location: [facility] OR;  Service: Pain Management;  Laterality: Right;    INJECTION OF ANESTHETIC AGENT AROUND MEDIAL BRANCH NERVES INNERVATING LUMBAR FACET JOINT Right 2024     Procedure: BLOCK, NERVE, FACET JOINT, LUMBAR, MEDIAL BRANCH Right L1-2   Patient does not want sedation;  Surgeon: Autumn Buckley MD;  Location: [facility] OR;  Service: Pain Management;  Laterality: Right;  Right MBB L1-2    spinal injections        SPINE SURGERY         fusion 2 levels with rods and screws, anterior approach    TONSILLECTOMY        turbinate reduction                   Family History   Problem Relation Age of Onset    Breast cancer Mother      Coronary artery disease Father      Colon cancer Father      Prostate cancer Father           Social History            Socioeconomic History    Marital status: Single   Tobacco Use    Smoking status: Former       Current packs/day: 0.00       Types: Cigarettes       Quit date: 3/2/2010       Years since quittin.9    Smokeless tobacco: Never   Substance and Sexual Activity    Alcohol use: Yes       Alcohol/week: 4.0 standard drinks of alcohol       Types: 4 Glasses of wine per week       Comment: WINE IN EVENING    Drug use: Not Currently    Sexual activity: Yes       Partners: Female      Social Determinants of Health           Financial Resource Strain: Low Risk  (2023)     Overall Financial Resource Strain (CARDIA)      Difficulty of Paying Living Expenses: Not hard at all   Food Insecurity: No Food Insecurity (2023)     Hunger Vital Sign      Worried About Running Out of Food in the Last Year: Never true      Ran Out of Food in the  Last Year: Never true   Transportation Needs: No Transportation Needs (4/25/2023)     PRAPARE - Transportation      Lack of Transportation (Medical): No      Lack of Transportation (Non-Medical): No   Physical Activity: Sufficiently Active (4/25/2023)     Exercise Vital Sign      Days of Exercise per Week: 5 days      Minutes of Exercise per Session: 40 min   Stress: No Stress Concern Present (4/25/2023)     [place] [employer] - Occupational Stress Questionnaire      Feeling of Stress : Not at all   Social Connections: Unknown (4/25/2023)     Social Connection and Isolation Panel [NHANES]      Frequency of Communication with Friends and Family: More than three times a week      Frequency of Social Gatherings with Friends and Family: More than three times a week      Active Member of Clubs or Organizations: Yes      Attends Club or Organization Meetings: More than 4 times per year      Marital Status:    Housing Stability: Unknown (4/25/2023)     Housing Stability Vital Sign      Unable to Pay for Housing in the Last Year: No      Unstable Housing in the Last Year: No         Current Medications          Current Outpatient Medications   Medication Sig Dispense Refill    acetaminophen (TYLENOL) 650 MG TbSR Take 650 mg by mouth 2 (two) times daily.        atorvastatin (LIPITOR) 40 MG tablet Take 40 mg by mouth once daily.        baclofen (LIORESAL) 10 MG tablet Take 10 mg by mouth 2 (two) times daily.        co-enzyme Q-10 30 mg capsule Take 30 mg by mouth once daily.        gabapentin (NEURONTIN) 800 MG tablet Take 800 mg by mouth 3 (three) times daily.        loratadine-pseudoephedrine 5-120 mg (CLARITIN-D 12-HOUR) 5-120 mg per tablet Take 1 tablet by mouth daily as needed.        nabumetone (RELAFEN) 500 MG tablet Take 750 mg by mouth 2 (two) times daily as needed for Pain.        PROLIA 60 mg/mL Syrg Inject 60 mg into the skin every 6 (six) months.        tamsulosin (FLOMAX) 0.4 mg Cap  Take 1 capsule by mouth once daily.        vitamin D (VITAMIN D3) 1000 units Tab Take 5,000 Units by mouth 3 (three) times a week.          No current facility-administered medications for this visit.            Review of patient's allergies indicates:  No Known Allergies

## 2024-03-18 NOTE — DISCHARGE SUMMARY
[facility] Surgical - Periop Services  Discharge Note  Short Stay    Procedure(s) (LRB):  BLOCK, NERVE, FACET JOINT, CERVICAL, MEDIAL BRANCH Right  T10-12 no sedation (Right)      OUTCOME: Patient tolerated treatment/procedure well without complication and is now ready for discharge.    DISPOSITION: Home or Self Care    FINAL DIAGNOSIS:  <principal problem not specified>    FOLLOWUP: In clinic    DISCHARGE INSTRUCTIONS:  No discharge procedures on file.     TIME SPENT ON DISCHARGE: 5 minutes

## 2024-03-19 LAB
CHOLEST SERPL-MSCNC: 115 MG/DL (ref 0–200)
HBA1C MFR BLD: 5.3 % (ref 4–6)
HDLC SERPL-MCNC: 67 MG/DL (ref 35–70)
LDLC SERPL CALC-MCNC: 35 MG/DL (ref 0–160)
TRIGL SERPL-MCNC: 67 MG/DL (ref 40–160)

## 2024-04-02 ENCOUNTER — OFFICE VISIT (OUTPATIENT)
Dept: PAIN MEDICINE | Facility: CLINIC | Age: 64
End: 2024-04-02
Payer: OTHER GOVERNMENT

## 2024-04-02 VITALS
HEART RATE: 77 BPM | WEIGHT: 195 LBS | BODY MASS INDEX: 27.92 KG/M2 | HEIGHT: 70 IN | SYSTOLIC BLOOD PRESSURE: 123 MMHG | DIASTOLIC BLOOD PRESSURE: 86 MMHG

## 2024-04-02 DIAGNOSIS — M47.816 LUMBAR SPONDYLOSIS: Primary | ICD-10-CM

## 2024-04-02 DIAGNOSIS — M51.34 THORACIC DEGENERATIVE DISC DISEASE: ICD-10-CM

## 2024-04-02 DIAGNOSIS — G89.29 CHRONIC BACK PAIN GREATER THAN 3 MONTHS DURATION: ICD-10-CM

## 2024-04-02 DIAGNOSIS — M47.814 THORACIC SPONDYLOSIS: ICD-10-CM

## 2024-04-02 DIAGNOSIS — M54.9 CHRONIC BACK PAIN GREATER THAN 3 MONTHS DURATION: ICD-10-CM

## 2024-04-02 DIAGNOSIS — M51.36 DDD (DEGENERATIVE DISC DISEASE), LUMBAR: ICD-10-CM

## 2024-04-02 PROCEDURE — 99213 OFFICE O/P EST LOW 20 MIN: CPT | Mod: ,,, | Performed by: ANESTHESIOLOGY

## 2024-04-02 NOTE — PROGRESS NOTES
Autumn Buckley MD        PATIENT NAME: Jaxson Ferrell  : 1960  DATE: 24  MRN: [id number]      Billing Provider: Autumn Buckley MD  Level of Service:   Patient PCP Information       Provider PCP Type    Anshul East MD General            Reason for Visit / Chief Complaint: Mid-back Pain (Post op Rt MBB T10-12, C/O pain level 7, states procedure helped some. Taking Gabapentin for pain.)       Update PCP  Update Chief Complaint         History of Present Illness / Problem Focused Workflow     Jaxson Ferrell presents to the clinic with Mid-back Pain (Post op Rt MBB T10-12, C/O pain level 7, states procedure helped some. Taking Gabapentin for pain.)     Patient presents for f/u of degenerative disc disease, thoracic and lumbar .  On 2019 patient underwent removal of L5-S1 instrumentation with L4-5 and L5-S1 a TLIF with pedicle screws.  He had stabilization with his symptoms after surgery.  He returned to Dr. Rivas at the end of  due to increased back pain and notable right leg weakness.    Primary pain located to right low thoracic and upper lumbar . States pain feels like a TENS unit when it contracts. Pain to this area wakes him up with spasms. He is unable to perform core exercises. Pain intense with walking, sweeping, mopping, sitting too long and standing too long.   Pain raidates to bilateral low back, bilateral buttocks, bilateral thighs to lateral aspect to bilateral ankles and bilateral feet numb on bottom with intermittent numbness to toes. Feels like sharp stabbing pain.  He underwent right T10-12 medial branch blocks a couple weeks ago. He reports getting 50% relief of the pain.  He no longer has the stabbing pain, but it has become dull. He is still having some axial back pain just below the levels of the MBB that were done, in the upper lumbar spine.     Back Pain  Associated symptoms include leg pain and numbness.   Low-back Pain  Associated symptoms include leg  pain and numbness.   Mid-back Pain  Associated symptoms include leg pain and numbness.       Review of Systems     Review of Systems   Musculoskeletal:  Positive for back pain and leg pain.   Neurological:  Positive for numbness.   All other systems reviewed and are negative.       Medical / Social / Family History     Past Medical History:   Diagnosis Date    Arthritis     High cholesterol     Kidney stones     Osteoporosis     Personal history of colonic polyps     Spinal stenosis     thoracic    Urinary retention     after anesthesia       Past Surgical History:   Procedure Laterality Date    COLONOSCOPY N/A 12/06/2022    [facility]    CYSTOSCOPY W/ LASER LITHOTRIPSY      EPIDURAL STEROID INJECTION INTO THORACIC SPINE N/A 03/20/2023    Procedure: INJECTION, STEROID, SPINE, THORACIC, EPIDURAL T10-11 interlaminar;  Surgeon: Autumn Buckley MD;  Location: [facility] OR;  Service: Pain Management;  Laterality: N/A;  T10-11 interlaminar    INJECTION OF ANESTHETIC AGENT AROUND MEDIAL BRANCH NERVES INNERVATING CERVICAL FACET JOINT Right 11/16/2023    Procedure: BLOCK, NERVE, FACET JOINT, CERVICAL, MEDIAL BRANCH Right MBB T10-12;  Surgeon: Autumn Buckley MD;  Location: [facility] OR;  Service: Pain Management;  Laterality: Right;    INJECTION OF ANESTHETIC AGENT AROUND MEDIAL BRANCH NERVES INNERVATING CERVICAL FACET JOINT Right 3/18/2024    Procedure: BLOCK, NERVE, FACET JOINT, CERVICAL, MEDIAL BRANCH Right  T10-12 no sedation;  Surgeon: Autumn Buckley MD;  Location: [facility] OR;  Service: Pain Management;  Laterality: Right;  Right MBB T10-12 no sedation    INJECTION OF ANESTHETIC AGENT AROUND MEDIAL BRANCH NERVES INNERVATING LUMBAR FACET JOINT Right 2/1/2024    Procedure: BLOCK, NERVE, FACET JOINT, LUMBAR, MEDIAL BRANCH Right L1-2   Patient does not want sedation;  Surgeon: Autumn Buckley MD;  Location: [facility] OR;  Service: Pain Management;  Laterality: Right;  Right MBB L1-2    spinal injections      SPINE SURGERY       fusion 2 levels with rods and screws, anterior approach    TONSILLECTOMY      turbinate reduction         Social History  Mr. Ferrell  reports that he quit smoking about 14 years ago. His smoking use included cigarettes. He has never used smokeless tobacco. He reports current alcohol use of about 4.0 standard drinks of alcohol per week. He reports that he does not currently use drugs.    Family History  Mr.'s Ferrell family history includes Breast cancer in his mother; Colon cancer in his father; Coronary artery disease in his father; Prostate cancer in his father.    Medications and Allergies     Medications  Outpatient Medications Marked as Taking for the 4/2/24 encounter (Office Visit) with Autumn Buckley MD   Medication Sig Dispense Refill    acetaminophen (TYLENOL) 650 MG TbSR Take 650 mg by mouth 2 (two) times daily.      atorvastatin (LIPITOR) 40 MG tablet Take 40 mg by mouth once daily.      baclofen (LIORESAL) 10 MG tablet Take 10 mg by mouth 2 (two) times daily.      co-enzyme Q-10 30 mg capsule Take 30 mg by mouth once daily.      gabapentin (NEURONTIN) 800 MG tablet Take 800 mg by mouth 3 (three) times daily.      loratadine-pseudoephedrine 5-120 mg (CLARITIN-D 12-HOUR) 5-120 mg per tablet Take 1 tablet by mouth daily as needed.      nabumetone (RELAFEN) 500 MG tablet Take 750 mg by mouth 2 (two) times daily as needed for Pain.      PROLIA 60 mg/mL Syrg Inject 60 mg into the skin every 6 (six) months.      tamsulosin (FLOMAX) 0.4 mg Cap Take 1 capsule by mouth once daily.      vitamin D (VITAMIN D3) 1000 units Tab Take 5,000 Units by mouth 3 (three) times a week.         Allergies  Review of patient's allergies indicates:  No Known Allergies    Physical Examination     Vitals:    04/02/24 0826   BP: 123/86   Pulse: 77     Spine Musculoskeletal Exam    Gait    Gait is normal.    Inspection    Thoracolumbar        Prior incision: lateral lumbar    Incision: clean, dry, intact and well-healed     Incisional drainage: none    Palpation    Thoracolumbar    Tenderness: present      Paraspinous: right    Right      Masses: none      Spasms: none      Crepitus: none      Muscle tone: increased    Left      Masses: none      Spasms: none      Crepitus: none      Muscle tone: normal    Range of Motion    Thoracolumbar        Thoracolumbar range of motion additional comments: Restricted range of motion in the lumbar spine secondary to pain.  Positive facet loading.    Strength    Thoracolumbar    Thoracolumbar motor exam is normal.       Sensory    Thoracolumbar    Thoracolumbar sensation is normal.    General      Constitutional: appears stated age, well-developed and well-nourished    Scleral icterus: no    Labored breathing: no    Psychiatric: normal mood and affect and no acute distress    Neurological: alert and oriented x3    Skin: intact    Lymphadenopathy: none  CV: extremities warm, well-perfused  Resp: nonlabored breathing      Assessment and Plan (including Health Maintenance)      Problem List  Smart Sets  Document Outside HM   :    Plan:   Thoracic degenerative disc disease    Lumbar spondylosis    Thoracic spondylosis    Chronic back pain greater than 3 months duration       We will proceed with right L1 - 3 diagnostic medial branch blocks next week as scheduled.  He does not wish to have sedation for this.    Problem List Items Addressed This Visit       Thoracic degenerative disc disease - Primary    Thoracic spondylosis    Lumbar spondylosis    Chronic back pain greater than 3 months duration         Future Appointments   Date Time Provider Department Center   5/2/2024  8:40 AM Anshul Ly MD [place] [id number]        There are no Patient Instructions on file for this visit.  No follow-ups on file.     Signature:  Autumn Buckley MD      Date of encounter: 4/2/24

## 2024-04-02 NOTE — H&P (VIEW-ONLY)
Autumn Buckley MD        PATIENT NAME: Jaxson Ferrell  : 1960  DATE: 24  MRN: [id number]      Billing Provider: Autumn Buckley MD  Level of Service:   Patient PCP Information       Provider PCP Type    Anshul East MD General            Reason for Visit / Chief Complaint: Mid-back Pain (Post op Rt MBB T10-12, C/O pain level 7, states procedure helped some. Taking Gabapentin for pain.)       Update PCP  Update Chief Complaint         History of Present Illness / Problem Focused Workflow     Jaxson Ferrell presents to the clinic with Mid-back Pain (Post op Rt MBB T10-12, C/O pain level 7, states procedure helped some. Taking Gabapentin for pain.)     Patient presents for f/u of degenerative disc disease, thoracic and lumbar .  On 2019 patient underwent removal of L5-S1 instrumentation with L4-5 and L5-S1 a TLIF with pedicle screws.  He had stabilization with his symptoms after surgery.  He returned to Dr. Rivas at the end of  due to increased back pain and notable right leg weakness.    Primary pain located to right low thoracic and upper lumbar . States pain feels like a TENS unit when it contracts. Pain to this area wakes him up with spasms. He is unable to perform core exercises. Pain intense with walking, sweeping, mopping, sitting too long and standing too long.   Pain raidates to bilateral low back, bilateral buttocks, bilateral thighs to lateral aspect to bilateral ankles and bilateral feet numb on bottom with intermittent numbness to toes. Feels like sharp stabbing pain.  He underwent right T10-12 medial branch blocks a couple weeks ago. He reports getting 50% relief of the pain.  He no longer has the stabbing pain, but it has become dull. He is still having some axial back pain just below the levels of the MBB that were done, in the upper lumbar spine.     Back Pain  Associated symptoms include leg pain and numbness.   Low-back Pain  Associated symptoms include leg  pain and numbness.   Mid-back Pain  Associated symptoms include leg pain and numbness.       Review of Systems     Review of Systems   Musculoskeletal:  Positive for back pain and leg pain.   Neurological:  Positive for numbness.   All other systems reviewed and are negative.       Medical / Social / Family History     Past Medical History:   Diagnosis Date    Arthritis     High cholesterol     Kidney stones     Osteoporosis     Personal history of colonic polyps     Spinal stenosis     thoracic    Urinary retention     after anesthesia       Past Surgical History:   Procedure Laterality Date    COLONOSCOPY N/A 12/06/2022    [facility]    CYSTOSCOPY W/ LASER LITHOTRIPSY      EPIDURAL STEROID INJECTION INTO THORACIC SPINE N/A 03/20/2023    Procedure: INJECTION, STEROID, SPINE, THORACIC, EPIDURAL T10-11 interlaminar;  Surgeon: Autumn Buckley MD;  Location: [facility] OR;  Service: Pain Management;  Laterality: N/A;  T10-11 interlaminar    INJECTION OF ANESTHETIC AGENT AROUND MEDIAL BRANCH NERVES INNERVATING CERVICAL FACET JOINT Right 11/16/2023    Procedure: BLOCK, NERVE, FACET JOINT, CERVICAL, MEDIAL BRANCH Right MBB T10-12;  Surgeon: Autumn Buckley MD;  Location: [facility] OR;  Service: Pain Management;  Laterality: Right;    INJECTION OF ANESTHETIC AGENT AROUND MEDIAL BRANCH NERVES INNERVATING CERVICAL FACET JOINT Right 3/18/2024    Procedure: BLOCK, NERVE, FACET JOINT, CERVICAL, MEDIAL BRANCH Right  T10-12 no sedation;  Surgeon: Autumn Buckley MD;  Location: [facility] OR;  Service: Pain Management;  Laterality: Right;  Right MBB T10-12 no sedation    INJECTION OF ANESTHETIC AGENT AROUND MEDIAL BRANCH NERVES INNERVATING LUMBAR FACET JOINT Right 2/1/2024    Procedure: BLOCK, NERVE, FACET JOINT, LUMBAR, MEDIAL BRANCH Right L1-2   Patient does not want sedation;  Surgeon: Autumn Buckley MD;  Location: [facility] OR;  Service: Pain Management;  Laterality: Right;  Right MBB L1-2    spinal injections      SPINE SURGERY       fusion 2 levels with rods and screws, anterior approach    TONSILLECTOMY      turbinate reduction         Social History  Mr. Ferrell  reports that he quit smoking about 14 years ago. His smoking use included cigarettes. He has never used smokeless tobacco. He reports current alcohol use of about 4.0 standard drinks of alcohol per week. He reports that he does not currently use drugs.    Family History  Mr.'s Ferrell family history includes Breast cancer in his mother; Colon cancer in his father; Coronary artery disease in his father; Prostate cancer in his father.    Medications and Allergies     Medications  Outpatient Medications Marked as Taking for the 4/2/24 encounter (Office Visit) with Autumn Buckley MD   Medication Sig Dispense Refill    acetaminophen (TYLENOL) 650 MG TbSR Take 650 mg by mouth 2 (two) times daily.      atorvastatin (LIPITOR) 40 MG tablet Take 40 mg by mouth once daily.      baclofen (LIORESAL) 10 MG tablet Take 10 mg by mouth 2 (two) times daily.      co-enzyme Q-10 30 mg capsule Take 30 mg by mouth once daily.      gabapentin (NEURONTIN) 800 MG tablet Take 800 mg by mouth 3 (three) times daily.      loratadine-pseudoephedrine 5-120 mg (CLARITIN-D 12-HOUR) 5-120 mg per tablet Take 1 tablet by mouth daily as needed.      nabumetone (RELAFEN) 500 MG tablet Take 750 mg by mouth 2 (two) times daily as needed for Pain.      PROLIA 60 mg/mL Syrg Inject 60 mg into the skin every 6 (six) months.      tamsulosin (FLOMAX) 0.4 mg Cap Take 1 capsule by mouth once daily.      vitamin D (VITAMIN D3) 1000 units Tab Take 5,000 Units by mouth 3 (three) times a week.         Allergies  Review of patient's allergies indicates:  No Known Allergies    Physical Examination     Vitals:    04/02/24 0826   BP: 123/86   Pulse: 77     Spine Musculoskeletal Exam    Gait    Gait is normal.    Inspection    Thoracolumbar        Prior incision: lateral lumbar    Incision: clean, dry, intact and well-healed     Incisional drainage: none    Palpation    Thoracolumbar    Tenderness: present      Paraspinous: right    Right      Masses: none      Spasms: none      Crepitus: none      Muscle tone: increased    Left      Masses: none      Spasms: none      Crepitus: none      Muscle tone: normal    Range of Motion    Thoracolumbar        Thoracolumbar range of motion additional comments: Restricted range of motion in the lumbar spine secondary to pain.  Positive facet loading.    Strength    Thoracolumbar    Thoracolumbar motor exam is normal.       Sensory    Thoracolumbar    Thoracolumbar sensation is normal.    General      Constitutional: appears stated age, well-developed and well-nourished    Scleral icterus: no    Labored breathing: no    Psychiatric: normal mood and affect and no acute distress    Neurological: alert and oriented x3    Skin: intact    Lymphadenopathy: none  CV: extremities warm, well-perfused  Resp: nonlabored breathing      Assessment and Plan (including Health Maintenance)      Problem List  Smart Sets  Document Outside HM   :    Plan:   Thoracic degenerative disc disease    Lumbar spondylosis    Thoracic spondylosis    Chronic back pain greater than 3 months duration       We will proceed with right L1 - 3 diagnostic medial branch blocks next week as scheduled.  He does not wish to have sedation for this.    Problem List Items Addressed This Visit       Thoracic degenerative disc disease - Primary    Thoracic spondylosis    Lumbar spondylosis    Chronic back pain greater than 3 months duration         Future Appointments   Date Time Provider Department Center   5/2/2024  8:40 AM Anshul Ly MD [place] [id number]        There are no Patient Instructions on file for this visit.  No follow-ups on file.     Signature:  Autumn Buckley MD      Date of encounter: 4/2/24

## 2024-04-11 ENCOUNTER — HOSPITAL ENCOUNTER (OUTPATIENT)
Facility: HOSPITAL | Age: 64
Discharge: HOME | End: 2024-04-11
Attending: ANESTHESIOLOGY | Admitting: ANESTHESIOLOGY
Payer: OTHER GOVERNMENT

## 2024-04-11 DIAGNOSIS — M54.9 CHRONIC BACK PAIN GREATER THAN 3 MONTHS DURATION: ICD-10-CM

## 2024-04-11 DIAGNOSIS — G89.29 CHRONIC BACK PAIN GREATER THAN 3 MONTHS DURATION: ICD-10-CM

## 2024-04-11 PROCEDURE — 63600175 PHARM REV CODE 636 W HCPCS: Mod: JZ,JG | Performed by: ANESTHESIOLOGY

## 2024-04-11 PROCEDURE — 64494 INJ PARAVERT F JNT L/S 2 LEV: CPT | Mod: RT | Performed by: ANESTHESIOLOGY

## 2024-04-11 PROCEDURE — 64494 INJ PARAVERT F JNT L/S 2 LEV: CPT | Mod: RT,,, | Performed by: ANESTHESIOLOGY

## 2024-04-11 PROCEDURE — 64493 INJ PARAVERT F JNT L/S 1 LEV: CPT | Mod: RT | Performed by: ANESTHESIOLOGY

## 2024-04-11 PROCEDURE — 64493 INJ PARAVERT F JNT L/S 1 LEV: CPT | Mod: RT,,, | Performed by: ANESTHESIOLOGY

## 2024-04-11 PROCEDURE — 25000003 PHARM REV CODE 250: Performed by: ANESTHESIOLOGY

## 2024-04-11 RX ORDER — LIDOCAINE HYDROCHLORIDE 10 MG/ML
INJECTION, SOLUTION EPIDURAL; INFILTRATION; INTRACAUDAL; PERINEURAL
Status: DISCONTINUED
Start: 2024-04-11 | End: 2024-04-11 | Stop reason: HOSPADM

## 2024-04-11 RX ORDER — BUPIVACAINE HYDROCHLORIDE 2.5 MG/ML
INJECTION, SOLUTION EPIDURAL; INFILTRATION; INTRACAUDAL
Status: DISCONTINUED | OUTPATIENT
Start: 2024-04-11 | End: 2024-04-11 | Stop reason: HOSPADM

## 2024-04-11 RX ORDER — LIDOCAINE HYDROCHLORIDE 10 MG/ML
INJECTION, SOLUTION EPIDURAL; INFILTRATION; INTRACAUDAL; PERINEURAL
Status: DISCONTINUED | OUTPATIENT
Start: 2024-04-11 | End: 2024-04-11 | Stop reason: HOSPADM

## 2024-04-11 NOTE — OP NOTE
Right L1-3 diagnostic medial branch blocks    Pre-Procedure Diagnoses:  1. Chronic pain syndrome  2. Chronic Low back pain  3. Lumbar facet arthropathy    Post-Procedure Diagnoses:  1. Chronic pain syndrome  2. Chronic Low back pain  3. Lumbar facet arthropathy    Anesthesia:  Local only    Estimated Blood Loss:  None    Complications:  None    Informed Consent:  The procedure, risks, benefits, and alternatives were discussed with the patient. There were no contraindications to the procedure. The patient expressed understanding and agreed to proceed. Fully informed written consent was obtained.     Description of the Procedure:  The patient was taken to the operating room. IV access was obtained prior to the start of the procedure. The patient was positioned prone on the fluoroscopy table. Continuous hemodynamic monitoring was initiated and continued throughout the duration of the procedure. The skin overlying the lumbosacral spine was prepped with Chloraprep and draped into a sterile field. Fluoroscopy was used to identify the locations of the right side L1, L2, and L3 medial branch nerves at the junctions of the superior articular processes and transverse processes of L2, L3, and L4, respectively. Skin anesthesia was achieved using 0.5 mL of 1% lidocaine over each injection site. A 22-gauge 3.5-inch Quinke spinal needle was slowly inserted and advanced under intermittent fluoroscopic guidance until it made bony contact at the target sites. Proper needle position was confirmed under AP, oblique, and lateral fluoroscopic views. Negative aspiration for blood or CSF was confirmed. Then 0.5 mL of 0.25% bupivacaine was easily injected at each level. There was no pain on injection. The needles were removed intact and bleeding was nil. The same procedure was repeated in identical fashion on the right side. Sterile bandages were applied. The patient was taken to the recovery room for further observation in stable  condition. The patient was then discharged home without any complications.

## 2024-04-11 NOTE — DISCHARGE SUMMARY
[facility] Surgical - Periop Services  Discharge Note  Short Stay    Procedure(s) (LRB):  BLOCK, NERVE, FACET JOINT, LUMBAR, MEDIAL BRANCH Right L1 L3  no ansesthesia (Right)      OUTCOME: Patient tolerated treatment/procedure well without complication and is now ready for discharge.    DISPOSITION: Home or Self Care    FINAL DIAGNOSIS:  <principal problem not specified>    FOLLOWUP: In clinic    DISCHARGE INSTRUCTIONS:  No discharge procedures on file.     TIME SPENT ON DISCHARGE: 5 minutes

## 2024-04-12 ENCOUNTER — PATIENT OUTREACH (OUTPATIENT)
Dept: ADMINISTRATIVE | Facility: HOSPITAL | Age: 64
End: 2024-04-12
Payer: OTHER GOVERNMENT

## 2024-04-12 NOTE — PROGRESS NOTES
Health Maintenance Topic(s) Outreach Outcomes & Actions Taken:    Lab(s) - Outreach Outcomes & Actions Taken  : External Records Uploaded & Care Team Updated if Applicable     Additional Notes:  Lipid 3/19/24  A1C 3/19/24

## 2024-04-13 VITALS
RESPIRATION RATE: 20 BRPM | OXYGEN SATURATION: 99 % | HEIGHT: 70 IN | BODY MASS INDEX: 28.21 KG/M2 | TEMPERATURE: 98 F | DIASTOLIC BLOOD PRESSURE: 61 MMHG | WEIGHT: 197.06 LBS | SYSTOLIC BLOOD PRESSURE: 138 MMHG | HEART RATE: 73 BPM

## 2024-04-26 ENCOUNTER — OFFICE VISIT (OUTPATIENT)
Dept: PAIN MEDICINE | Facility: CLINIC | Age: 64
End: 2024-04-26
Payer: OTHER GOVERNMENT

## 2024-04-26 VITALS
WEIGHT: 190 LBS | HEART RATE: 75 BPM | DIASTOLIC BLOOD PRESSURE: 80 MMHG | SYSTOLIC BLOOD PRESSURE: 131 MMHG | HEIGHT: 70 IN | BODY MASS INDEX: 27.2 KG/M2

## 2024-04-26 DIAGNOSIS — M51.36 DDD (DEGENERATIVE DISC DISEASE), LUMBAR: ICD-10-CM

## 2024-04-26 DIAGNOSIS — M54.9 DORSALGIA, UNSPECIFIED: ICD-10-CM

## 2024-04-26 DIAGNOSIS — G89.29 CHRONIC BACK PAIN GREATER THAN 3 MONTHS DURATION: ICD-10-CM

## 2024-04-26 DIAGNOSIS — M54.9 CHRONIC BACK PAIN GREATER THAN 3 MONTHS DURATION: ICD-10-CM

## 2024-04-26 DIAGNOSIS — M47.814 THORACIC SPONDYLOSIS: ICD-10-CM

## 2024-04-26 DIAGNOSIS — M47.816 LUMBAR SPONDYLOSIS: Primary | ICD-10-CM

## 2024-04-26 PROCEDURE — 99213 OFFICE O/P EST LOW 20 MIN: CPT | Mod: ,,, | Performed by: ANESTHESIOLOGY

## 2024-04-26 NOTE — PROGRESS NOTES
Autumn Buckley MD        PATIENT NAME: Jaxson Ferrell  : 1960  DATE: 24  MRN: [id number]      Billing Provider: Autumn Buckley MD  Level of Service:   Patient PCP Information       Provider PCP Type    Anshul East MD General            Reason for Visit / Chief Complaint: Post-op Evaluation (2wks out Post-Op MBB Rt L1-L3 24. Patient states he talked about doing a CT scan and would to revisit that. He is  hurting on today but the injection did help some. Pain scale 7)       Update PCP  Update Chief Complaint         History of Present Illness / Problem Focused Workflow     Jaxson Ferrell presents to the clinic with Post-op Evaluation (2wks out Post-Op MBB Rt L1-L3 24. Patient states he talked about doing a CT scan and would to revisit that. He is  hurting on today but the injection did help some. Pain scale 7)     This is a 63-year-old male who presents to clinic today for follow up of his chronic right-sided lower thoracic and lumbar back pain.  Most recently, he underwent right L1-3 diagnostic medial branch blocks about 2 weeks ago.  He reports 30% improvement after these injections.  He had previously undergone right T10-12 diagnostic medial branch blocks on 2 separate occasions.  He reported 50% improvement of his pain after those injections.  He is feels like he is still having some pain lower than that.      Back Pain  Associated symptoms include leg pain and numbness.   Low-back Pain  Associated symptoms include leg pain and numbness.   Mid-back Pain  Associated symptoms include leg pain and numbness.       Review of Systems     Review of Systems   Musculoskeletal:  Positive for back pain and leg pain.   Neurological:  Positive for numbness.   All other systems reviewed and are negative.       Medical / Social / Family History     Past Medical History:   Diagnosis Date    Arthritis     High cholesterol     Kidney stones     Osteoporosis     Personal history of colonic  polyps     Spinal stenosis     thoracic    Urinary retention     after anesthesia       Past Surgical History:   Procedure Laterality Date    COLONOSCOPY N/A 12/06/2022    [facility]    CYSTOSCOPY W/ LASER LITHOTRIPSY      EPIDURAL STEROID INJECTION INTO THORACIC SPINE N/A 03/20/2023    Procedure: INJECTION, STEROID, SPINE, THORACIC, EPIDURAL T10-11 interlaminar;  Surgeon: Autumn Buckley MD;  Location: [facility] OR;  Service: Pain Management;  Laterality: N/A;  T10-11 interlaminar    INJECTION OF ANESTHETIC AGENT AROUND MEDIAL BRANCH NERVES INNERVATING CERVICAL FACET JOINT Right 11/16/2023    Procedure: BLOCK, NERVE, FACET JOINT, CERVICAL, MEDIAL BRANCH Right MBB T10-12;  Surgeon: Autumn Buckley MD;  Location: [facility] OR;  Service: Pain Management;  Laterality: Right;    INJECTION OF ANESTHETIC AGENT AROUND MEDIAL BRANCH NERVES INNERVATING CERVICAL FACET JOINT Right 3/18/2024    Procedure: BLOCK, NERVE, FACET JOINT, CERVICAL, MEDIAL BRANCH Right  T10-12 no sedation;  Surgeon: Autumn Buckley MD;  Location: [facility] OR;  Service: Pain Management;  Laterality: Right;  Right MBB T10-12 no sedation    INJECTION OF ANESTHETIC AGENT AROUND MEDIAL BRANCH NERVES INNERVATING LUMBAR FACET JOINT Right 2/1/2024    Procedure: BLOCK, NERVE, FACET JOINT, LUMBAR, MEDIAL BRANCH Right L1-2   Patient does not want sedation;  Surgeon: Autumn Buckley MD;  Location: [facility] OR;  Service: Pain Management;  Laterality: Right;  Right MBB L1-2    INJECTION OF ANESTHETIC AGENT AROUND MEDIAL BRANCH NERVES INNERVATING LUMBAR FACET JOINT Right 4/11/2024    Procedure: BLOCK, NERVE, FACET JOINT, LUMBAR, MEDIAL BRANCH Right L1 L3  no ansesthesia;  Surgeon: Autumn Buckley MD;  Location: [facility] OR;  Service: Pain Management;  Laterality: Right;  MBB Rt L1-L3 *No Anesthesia*    spinal injections      SPINE SURGERY      fusion 2 levels with rods and screws, anterior approach    TONSILLECTOMY      turbinate reduction         Social History    Mariaelena  reports that he quit smoking about 14 years ago. His smoking use included cigarettes. He has never used smokeless tobacco. He reports current alcohol use of about 1.0 standard drink of alcohol per week. He reports that he does not currently use drugs.    Family History  Mr.'s Ferrell family history includes Breast cancer in his mother; Colon cancer in his father; Coronary artery disease in his father; Prostate cancer in his father.    Medications and Allergies     Medications  Current Outpatient Medications   Medication Sig Dispense Refill    acetaminophen (TYLENOL) 650 MG TbSR Take 650 mg by mouth 2 (two) times daily.      atorvastatin (LIPITOR) 40 MG tablet Take 40 mg by mouth once daily.      baclofen (LIORESAL) 10 MG tablet Take 10 mg by mouth 2 (two) times daily.      co-enzyme Q-10 30 mg capsule Take 30 mg by mouth once daily.      gabapentin (NEURONTIN) 800 MG tablet Take 800 mg by mouth 3 (three) times daily.      loratadine-pseudoephedrine 5-120 mg (CLARITIN-D 12-HOUR) 5-120 mg per tablet Take 1 tablet by mouth daily as needed.      nabumetone (RELAFEN) 500 MG tablet Take 750 mg by mouth 2 (two) times daily as needed for Pain.      PROLIA 60 mg/mL Syrg Inject 60 mg into the skin every 6 (six) months.      tamsulosin (FLOMAX) 0.4 mg Cap Take 1 capsule by mouth once daily.      vitamin D (VITAMIN D3) 1000 units Tab Take 5,000 Units by mouth 3 (three) times a week.       No current facility-administered medications for this visit.       Allergies  Review of patient's allergies indicates:  No Known Allergies    Physical Examination     Vitals:    04/26/24 0810   BP: 131/80   Pulse: 75     Spine Musculoskeletal Exam    Gait    Gait is normal.    Inspection    Thoracolumbar        Prior incision: lateral lumbar    Incision: clean, dry, intact and well-healed    Incisional drainage: none    Palpation    Thoracolumbar    Tenderness: present      Paraspinous: right    Right      Masses: none      Spasms:  none      Crepitus: none      Muscle tone: increased    Left      Masses: none      Spasms: none      Crepitus: none      Muscle tone: normal    Range of Motion    Thoracolumbar        Thoracolumbar range of motion additional comments: Restricted range of motion in the lumbar spine secondary to pain.  Positive facet loading.    Strength    Thoracolumbar    Thoracolumbar motor exam is normal.       Sensory    Thoracolumbar    Thoracolumbar sensation is normal.    General      Constitutional: appears stated age, well-developed and well-nourished    Scleral icterus: no    Labored breathing: no    Psychiatric: normal mood and affect and no acute distress    Neurological: alert and oriented x3    Skin: intact    Lymphadenopathy: none  CV: extremities warm, well-perfused  Resp: nonlabored breathing      Assessment and Plan (including Health Maintenance)      Problem List  Smart Sets  Document Outside HM   :    Plan:   Lumbar spondylosis  -     CT Lumbar Spine Without Contrast; Future; Expected date: 04/26/2024    Thoracic spondylosis    Chronic back pain greater than 3 months duration  -     CT Lumbar Spine Without Contrast; Future; Expected date: 04/26/2024    Dorsalgia, unspecified  -     CT Lumbar Spine Without Contrast; Future; Expected date: 04/26/2024       He would like to proceed with right T10-12 medial branch radiofrequency ablations.  We will also proceed with a CT of the lumbar spine for further evaluation of his lumbar pain, with history of previous lumbar fusion.    Problem List Items Addressed This Visit          Neuro    Thoracic spondylosis    Lumbar spondylosis - Primary    Relevant Orders    CT Lumbar Spine Without Contrast       Orthopedic    Chronic back pain greater than 3 months duration    Relevant Orders    CT Lumbar Spine Without Contrast     Other Visit Diagnoses       Dorsalgia, unspecified        Relevant Orders    CT Lumbar Spine Without Contrast              Future Appointments   Date Time  Provider Department Center   5/2/2024  8:40 AM Anshul Ly MD [place] [id number]        There are no Patient Instructions on file for this visit.  No follow-ups on file.     Signature:  Autumn Buckley MD      Date of encounter: 4/26/24

## 2024-04-26 NOTE — H&P (VIEW-ONLY)
Autumn Buckley MD        PATIENT NAME: Jaxson Ferrell  : 1960  DATE: 24  MRN: [id number]      Billing Provider: Autumn Buckley MD  Level of Service:   Patient PCP Information       Provider PCP Type    Anshul East MD General            Reason for Visit / Chief Complaint: Post-op Evaluation (2wks out Post-Op MBB Rt L1-L3 24. Patient states he talked about doing a CT scan and would to revisit that. He is  hurting on today but the injection did help some. Pain scale 7)       Update PCP  Update Chief Complaint         History of Present Illness / Problem Focused Workflow     Jaxson Ferrell presents to the clinic with Post-op Evaluation (2wks out Post-Op MBB Rt L1-L3 24. Patient states he talked about doing a CT scan and would to revisit that. He is  hurting on today but the injection did help some. Pain scale 7)     This is a 63-year-old male who presents to clinic today for follow up of his chronic right-sided lower thoracic and lumbar back pain.  Most recently, he underwent right L1-3 diagnostic medial branch blocks about 2 weeks ago.  He reports 30% improvement after these injections.  He had previously undergone right T10-12 diagnostic medial branch blocks on 2 separate occasions.  He reported 50% improvement of his pain after those injections.  He is feels like he is still having some pain lower than that.      Back Pain  Associated symptoms include leg pain and numbness.   Low-back Pain  Associated symptoms include leg pain and numbness.   Mid-back Pain  Associated symptoms include leg pain and numbness.       Review of Systems     Review of Systems   Musculoskeletal:  Positive for back pain and leg pain.   Neurological:  Positive for numbness.   All other systems reviewed and are negative.       Medical / Social / Family History     Past Medical History:   Diagnosis Date    Arthritis     High cholesterol     Kidney stones     Osteoporosis     Personal history of colonic  polyps     Spinal stenosis     thoracic    Urinary retention     after anesthesia       Past Surgical History:   Procedure Laterality Date    COLONOSCOPY N/A 12/06/2022    Varghese Padilla    CYSTOSCOPY W/ LASER LITHOTRIPSY      EPIDURAL STEROID INJECTION INTO THORACIC SPINE N/A 03/20/2023    Procedure: INJECTION, STEROID, SPINE, THORACIC, EPIDURAL T10-11 interlaminar;  Surgeon: Autumn Buckley MD;  Location: [facility] OR;  Service: Pain Management;  Laterality: N/A;  T10-11 interlaminar    INJECTION OF ANESTHETIC AGENT AROUND MEDIAL BRANCH NERVES INNERVATING CERVICAL FACET JOINT Right 11/16/2023    Procedure: BLOCK, NERVE, FACET JOINT, CERVICAL, MEDIAL BRANCH Right MBB T10-12;  Surgeon: Autumn Buckley MD;  Location: [facility] OR;  Service: Pain Management;  Laterality: Right;    INJECTION OF ANESTHETIC AGENT AROUND MEDIAL BRANCH NERVES INNERVATING CERVICAL FACET JOINT Right 3/18/2024    Procedure: BLOCK, NERVE, FACET JOINT, CERVICAL, MEDIAL BRANCH Right  T10-12 no sedation;  Surgeon: Autumn Buckley MD;  Location: [facility] OR;  Service: Pain Management;  Laterality: Right;  Right MBB T10-12 no sedation    INJECTION OF ANESTHETIC AGENT AROUND MEDIAL BRANCH NERVES INNERVATING LUMBAR FACET JOINT Right 2/1/2024    Procedure: BLOCK, NERVE, FACET JOINT, LUMBAR, MEDIAL BRANCH Right L1-2   Patient does not want sedation;  Surgeon: Autumn Buckley MD;  Location: [facility] OR;  Service: Pain Management;  Laterality: Right;  Right MBB L1-2    INJECTION OF ANESTHETIC AGENT AROUND MEDIAL BRANCH NERVES INNERVATING LUMBAR FACET JOINT Right 4/11/2024    Procedure: BLOCK, NERVE, FACET JOINT, LUMBAR, MEDIAL BRANCH Right L1 L3  no ansesthesia;  Surgeon: Autumn Buckley MD;  Location: [facility] OR;  Service: Pain Management;  Laterality: Right;  MBB Rt L1-L3 *No Anesthesia*    spinal injections      SPINE SURGERY      fusion 2 levels with rods and screws, anterior approach    TONSILLECTOMY      turbinate reduction         Social History    Mariaelena  reports that he quit smoking about 14 years ago. His smoking use included cigarettes. He has never used smokeless tobacco. He reports current alcohol use of about 1.0 standard drink of alcohol per week. He reports that he does not currently use drugs.    Family History  Mr.'s Ferrell family history includes Breast cancer in his mother; Colon cancer in his father; Coronary artery disease in his father; Prostate cancer in his father.    Medications and Allergies     Medications  Current Outpatient Medications   Medication Sig Dispense Refill    acetaminophen (TYLENOL) 650 MG TbSR Take 650 mg by mouth 2 (two) times daily.      atorvastatin (LIPITOR) 40 MG tablet Take 40 mg by mouth once daily.      baclofen (LIORESAL) 10 MG tablet Take 10 mg by mouth 2 (two) times daily.      co-enzyme Q-10 30 mg capsule Take 30 mg by mouth once daily.      gabapentin (NEURONTIN) 800 MG tablet Take 800 mg by mouth 3 (three) times daily.      loratadine-pseudoephedrine 5-120 mg (CLARITIN-D 12-HOUR) 5-120 mg per tablet Take 1 tablet by mouth daily as needed.      nabumetone (RELAFEN) 500 MG tablet Take 750 mg by mouth 2 (two) times daily as needed for Pain.      PROLIA 60 mg/mL Syrg Inject 60 mg into the skin every 6 (six) months.      tamsulosin (FLOMAX) 0.4 mg Cap Take 1 capsule by mouth once daily.      vitamin D (VITAMIN D3) 1000 units Tab Take 5,000 Units by mouth 3 (three) times a week.       No current facility-administered medications for this visit.       Allergies  Review of patient's allergies indicates:  No Known Allergies    Physical Examination     Vitals:    04/26/24 0810   BP: 131/80   Pulse: 75     Spine Musculoskeletal Exam    Gait    Gait is normal.    Inspection    Thoracolumbar        Prior incision: lateral lumbar    Incision: clean, dry, intact and well-healed    Incisional drainage: none    Palpation    Thoracolumbar    Tenderness: present      Paraspinous: right    Right      Masses: none      Spasms:  none      Crepitus: none      Muscle tone: increased    Left      Masses: none      Spasms: none      Crepitus: none      Muscle tone: normal    Range of Motion    Thoracolumbar        Thoracolumbar range of motion additional comments: Restricted range of motion in the lumbar spine secondary to pain.  Positive facet loading.    Strength    Thoracolumbar    Thoracolumbar motor exam is normal.       Sensory    Thoracolumbar    Thoracolumbar sensation is normal.    General      Constitutional: appears stated age, well-developed and well-nourished    Scleral icterus: no    Labored breathing: no    Psychiatric: normal mood and affect and no acute distress    Neurological: alert and oriented x3    Skin: intact    Lymphadenopathy: none  CV: extremities warm, well-perfused  Resp: nonlabored breathing      Assessment and Plan (including Health Maintenance)      Problem List  Smart Sets  Document Outside HM   :    Plan:   Lumbar spondylosis  -     CT Lumbar Spine Without Contrast; Future; Expected date: 04/26/2024    Thoracic spondylosis    Chronic back pain greater than 3 months duration  -     CT Lumbar Spine Without Contrast; Future; Expected date: 04/26/2024    Dorsalgia, unspecified  -     CT Lumbar Spine Without Contrast; Future; Expected date: 04/26/2024       He would like to proceed with right T10-12 medial branch radiofrequency ablations.  We will also proceed with a CT of the lumbar spine for further evaluation of his lumbar pain, with history of previous lumbar fusion.    Problem List Items Addressed This Visit          Neuro    Thoracic spondylosis    Lumbar spondylosis - Primary    Relevant Orders    CT Lumbar Spine Without Contrast       Orthopedic    Chronic back pain greater than 3 months duration    Relevant Orders    CT Lumbar Spine Without Contrast     Other Visit Diagnoses       Dorsalgia, unspecified        Relevant Orders    CT Lumbar Spine Without Contrast              Future Appointments   Date Time  Provider Department Center   5/2/2024  8:40 AM Anshul Ly MD [place] [id number]        There are no Patient Instructions on file for this visit.  No follow-ups on file.     Signature:  Autumn Buckley MD      Date of encounter: 4/26/24

## 2024-05-01 PROBLEM — Z00.00 WELLNESS EXAMINATION: Status: ACTIVE | Noted: 2024-05-01

## 2024-05-02 ENCOUNTER — OFFICE VISIT (OUTPATIENT)
Dept: INTERNAL MEDICINE | Facility: CLINIC | Age: 64
End: 2024-05-02
Payer: OTHER GOVERNMENT

## 2024-05-02 VITALS
HEIGHT: 70 IN | OXYGEN SATURATION: 98 % | SYSTOLIC BLOOD PRESSURE: 120 MMHG | BODY MASS INDEX: 27.97 KG/M2 | WEIGHT: 195.38 LBS | HEART RATE: 77 BPM | DIASTOLIC BLOOD PRESSURE: 66 MMHG

## 2024-05-02 DIAGNOSIS — M48.00 SPINAL STENOSIS, UNSPECIFIED SPINAL REGION: Chronic | ICD-10-CM

## 2024-05-02 DIAGNOSIS — Z12.5 SCREENING FOR PROSTATE CANCER: ICD-10-CM

## 2024-05-02 DIAGNOSIS — Z00.00 WELLNESS EXAMINATION: Primary | ICD-10-CM

## 2024-05-02 DIAGNOSIS — E78.2 HYPERLIPIDEMIA, MIXED: Chronic | ICD-10-CM

## 2024-05-02 DIAGNOSIS — M81.0 AGE-RELATED OSTEOPOROSIS WITHOUT CURRENT PATHOLOGICAL FRACTURE: Chronic | ICD-10-CM

## 2024-05-02 PROCEDURE — 99396 PREV VISIT EST AGE 40-64: CPT | Mod: ,,, | Performed by: INTERNAL MEDICINE

## 2024-05-02 NOTE — PROGRESS NOTES
Anshul East MD        PATIENT NAME: Jaxson Ferrell  : 1960  DATE: 24  MRN: [id number]      Billing Provider: Anshul East MD  Level of Service: AL PREVENTIVE VISIT,EST,40-64  Patient PCP Information       Provider PCP Type    Anshul East MD General            Reason for Visit / Chief Complaint: Annual Exam (Wellness/)       Update PCP  Update Chief Complaint         History of Present Illness / Problem Focused Workflow     Jaxson Ferrell presents to the clinic with Annual Exam (Wellness/)     Aman is here for his annual wellness exam   His main problem is his back pain that is worsening on him   He is having injections and nerve ablation with Dr. Mueller  He is having neuropathic symptoms of his right leg.        Review of Systems   Review of Systems   Constitutional: Negative.    HENT: Negative.     Eyes: Negative.    Respiratory: Negative.     Cardiovascular: Negative.    Gastrointestinal: Negative.    Endocrine: Negative.    Genitourinary: Negative.    Musculoskeletal:  Positive for back pain.   Integumentary:  Negative.   Neurological: Negative.    Psychiatric/Behavioral: Negative.          Medical / Social / Family History     Past Medical History:   Diagnosis Date    Kidney stones     Osteoporosis     Spinal stenosis     thoracic       Past Surgical History:   Procedure Laterality Date    COLONOSCOPY N/A 2022    Varghese Padilla    CYSTOSCOPY W/ LASER LITHOTRIPSY      EPIDURAL STEROID INJECTION INTO THORACIC SPINE N/A 2023    Procedure: INJECTION, STEROID, SPINE, THORACIC, EPIDURAL T10-11 interlaminar;  Surgeon: Autumn Buckley MD;  Location: [facility] OR;  Service: Pain Management;  Laterality: N/A;  T10-11 interlaminar    INJECTION OF ANESTHETIC AGENT AROUND MEDIAL BRANCH NERVES INNERVATING CERVICAL FACET JOINT Right 2023    Procedure: BLOCK, NERVE, FACET JOINT, CERVICAL, MEDIAL BRANCH Right MBB T10-12;  Surgeon: Autumn Buckley MD;  Location: [facility]  OR;  Service: Pain Management;  Laterality: Right;    INJECTION OF ANESTHETIC AGENT AROUND MEDIAL BRANCH NERVES INNERVATING CERVICAL FACET JOINT Right 03/18/2024    Procedure: BLOCK, NERVE, FACET JOINT, CERVICAL, MEDIAL BRANCH Right  T10-12 no sedation;  Surgeon: Autumn Buckley MD;  Location: [facility] OR;  Service: Pain Management;  Laterality: Right;  Right MBB T10-12 no sedation    INJECTION OF ANESTHETIC AGENT AROUND MEDIAL BRANCH NERVES INNERVATING LUMBAR FACET JOINT Right 02/01/2024    Procedure: BLOCK, NERVE, FACET JOINT, LUMBAR, MEDIAL BRANCH Right L1-2   Patient does not want sedation;  Surgeon: Autumn Buckley MD;  Location: [facility] OR;  Service: Pain Management;  Laterality: Right;  Right MBB L1-2    INJECTION OF ANESTHETIC AGENT AROUND MEDIAL BRANCH NERVES INNERVATING LUMBAR FACET JOINT Right 04/11/2024    Procedure: BLOCK, NERVE, FACET JOINT, LUMBAR, MEDIAL BRANCH Right L1 L3  no ansesthesia;  Surgeon: Autumn Buckley MD;  Location: [facility] OR;  Service: Pain Management;  Laterality: Right;  MBB Rt L1-L3 *No Anesthesia*    spinal injections      SPINE SURGERY      fusion 2 levels with rods and screws, anterior approach    TONSILLECTOMY      turbinate reduction         Social History  Mr. Ferrell  reports that he quit smoking about 14 years ago. His smoking use included cigarettes. He has never used smokeless tobacco. He reports current alcohol use of about 3.0 standard drinks of alcohol per week. He reports that he does not currently use drugs.    Family History  Mr.'s Ferrell  family history includes Breast cancer in his mother; Colon cancer in his father; Coronary artery disease in his father; Prostate cancer in his father.    Medications and Allergies     Medications  Current Outpatient Medications   Medication Sig Dispense Refill    acetaminophen (TYLENOL) 650 MG TbSR Take 650 mg by mouth 2 (two) times daily.      atorvastatin (LIPITOR) 40 MG tablet Take 40 mg by mouth once daily.      baclofen  (LIORESAL) 10 MG tablet Take 10 mg by mouth 2 (two) times daily.      co-enzyme Q-10 30 mg capsule Take 30 mg by mouth once daily.      gabapentin (NEURONTIN) 800 MG tablet Take 800 mg by mouth 3 (three) times daily.      loratadine-pseudoephedrine 5-120 mg (CLARITIN-D 12-HOUR) 5-120 mg per tablet Take 1 tablet by mouth daily as needed.      nabumetone (RELAFEN) 500 MG tablet Take 750 mg by mouth 2 (two) times daily as needed for Pain.      PROLIA 60 mg/mL Syrg Inject 60 mg into the skin every 6 (six) months.      tamsulosin (FLOMAX) 0.4 mg Cap Take 1 capsule by mouth once daily.      vitamin D (VITAMIN D3) 1000 units Tab Take 5,000 Units by mouth 3 (three) times a week.       No current facility-administered medications for this visit.       Allergies  Review of patient's allergies indicates:  No Known Allergies    Physical Examination     Vitals:    05/02/24 0847   BP: 120/66   Pulse: 77     Physical Exam  Vitals reviewed.   Constitutional:       Appearance: Normal appearance.   HENT:      Head: Normocephalic.      Right Ear: Tympanic membrane normal.      Left Ear: Tympanic membrane normal.      Nose: Nose normal.      Mouth/Throat:      Pharynx: Oropharynx is clear.   Eyes:      Extraocular Movements: Extraocular movements intact.      Pupils: Pupils are equal, round, and reactive to light.   Cardiovascular:      Rate and Rhythm: Normal rate and regular rhythm.      Pulses: Normal pulses.      Heart sounds: Normal heart sounds.   Pulmonary:      Effort: Pulmonary effort is normal.      Breath sounds: Normal breath sounds.   Abdominal:      General: Abdomen is flat. Bowel sounds are normal.      Palpations: Abdomen is soft.   Musculoskeletal:         General: Normal range of motion.      Cervical back: Normal range of motion.   Skin:     General: Skin is warm and dry.   Neurological:      General: No focal deficit present.      Mental Status: He is alert and oriented to person, place, and time.   Psychiatric:          Mood and Affect: Mood normal.         Behavior: Behavior normal.          Assessment and Plan (including Health Maintenance)      Problem List  Smart Sets  Document Outside HM   :    Plan:    ICD-10-CM ICD-9-CM   1. Wellness examination  Z00.00 V70.0   2. Screening for prostate cancer  Z12.5 V76.44   3. Hyperlipidemia, mixed  E78.2 272.2   4. Age-related osteoporosis without current pathological fracture  M81.0 733.01   5. Spinal stenosis, unspecified spinal region  M48.00 724.00       Problem List Items Addressed This Visit          Neuro    Spinal stenosis (Chronic)     Significant back pain and leg pain   Refer him to neurosurgery for further evaluation            Cardiac/Vascular    Hyperlipidemia, mixed (Chronic)     Lipid level stable continue Lipitor            Endocrine    Osteoporosis (Chronic)     Stable on treatment            Other    Wellness examination - Primary     Discussed results of examination lab   Sees Dr. [facility]ata [facility] for his prostate and kidney stone issues.    He will return 1 year annual wellness          Other Visit Diagnoses       Screening for prostate cancer                       Health Maintenance Due   Topic Date Due    Hepatitis C Screening  Never done    HIV Screening  Never done    TETANUS VACCINE  Never done    RSV Vaccine (Age 60+ and Pregnant patients) (1 - 1-dose 60+ series) Never done    COVID-19 Vaccine (4 - 2023-24 season) 09/01/2023       Problem List Items Addressed This Visit          Neuro    Spinal stenosis (Chronic)    Overview     thoracic         Current Assessment & Plan     Significant back pain and leg pain   Refer him to neurosurgery for further evaluation            Cardiac/Vascular    Hyperlipidemia, mixed (Chronic)    Current Assessment & Plan     Lipid level stable continue Lipitor            Endocrine    Osteoporosis (Chronic)    Current Assessment & Plan     Stable on treatment            Other    Wellness examination - Primary    Current Assessment  & Plan     Discussed results of examination lab   Sees Dr. Mata [facility] for his prostate and kidney stone issues.    He will return 1 year annual wellness          Other Visit Diagnoses       Screening for prostate cancer                Health Maintenance Topics with due status: Not Due       Topic Last Completion Date    Colorectal Cancer Screening 12/06/2022    Hemoglobin A1c (Diabetic Prevention Screening) 03/19/2024    Lipid Panel 03/19/2024    Influenza Vaccine Not Due       Future Appointments   Date Time Provider Department Center   5/14/2024  9:45 AM [facility] CT1  [address]IT [facility]   5/21/2024  8:30 AM Seamus Fortune, NP [facility]Y [facility]AVEZ            Signature:  Anshul East MD  [facility]  [facility]  [place] [id number]    Date of encounter: 5/2/24

## 2024-05-02 NOTE — ASSESSMENT & PLAN NOTE
Discussed results of examination lab   Sees Dr. Mata [facility] for his prostate and kidney stone issues.    He will return 1 year annual wellness

## 2024-05-03 ENCOUNTER — TELEPHONE (OUTPATIENT)
Dept: NEUROSURGERY | Facility: CLINIC | Age: 64
End: 2024-05-03
Payer: OTHER GOVERNMENT

## 2024-05-03 NOTE — TELEPHONE ENCOUNTER
I received the referral for Dr. Bo from Dr. Ly for spinal stenosis. After reviewing the chart, the patient is a previous patient of Dr. Rivas, last seen on 11/23/21. It has not been 3 years yet since we have seen the patient so he would not be considered a NP. From the telephone encounter on 12/21/22, Dr. Rivas wanted the patient to see Dr. Winston or Dr. Sanchez at [facility]. They are both complex spine surgeons who could give the patient an opinion as to the source and possible treatments of his pain. Dr. Rivas has not heard of protective muscle spasms as mentioned by Dr. Farrell. I tried to call the patient to see if he was still experiencing the same symptoms as before, he did not answer so I LMOM.

## 2024-05-05 ENCOUNTER — ANESTHESIA EVENT (OUTPATIENT)
Dept: SURGERY | Facility: HOSPITAL | Age: 64
End: 2024-05-05
Payer: OTHER GOVERNMENT

## 2024-05-05 NOTE — ANESTHESIA PREPROCEDURE EVALUATION
"                                                                                                             05/05/2024  Jaxson Ferrell is a 63 y.o., male presents as an outpatient for RFA L1-2.  Patient tolerated IV sedation for cervical medial branch block in November.Patient notes urinary retention with prior general anesthetics which he attributes to narcotics     Last 3 sets of Vitals        4/30/2024     9:43 AM 5/2/2024     8:47 AM 5/6/2024     6:30 AM   Vitals - 1 value per visit   SYSTOLIC  120 120   DIASTOLIC  66 83   Pulse  77 71   Temp   36.9 °C (98.5 °F)   SPO2  98 % 96 %   Weight (lb) 190 195.4    Weight (kg) 86.183 88.633    Height 5' 10" (1.778 m) 5' 10" (1.778 m)    BMI (Calculated) 27.3 28    Pain Score  Zero          Lab Results   Component Value Date    WBC 6.6 11/23/2019    HGB 9.1 (L) 11/23/2019    HCT 48.0 03/17/2022    MCV 92.6 11/23/2019     11/23/2019          Pre-op Assessment    I have reviewed the Patient Summary Reports.    I have reviewed the NPO Status.   I have reviewed the Medications.     Review of Systems  Anesthesia Hx:               Denies Personal Hx of Anesthesia complications.                    Social:  Non-Smoker       Cardiovascular:   Functional Capacity good / => 4 METS                             Physical Exam  General: Well nourished, Cooperative, Alert and Oriented    Airway:  Mallampati: II   Mouth Opening: Normal  TM Distance: Normal  Tongue: Normal  Neck ROM: Normal ROM    Dental:  Intact    Chest/Lungs:  Clear to auscultation, Normal Respiratory Rate    Heart:  Rate: Normal  Rhythm: Regular Rhythm        Anesthesia Plan  Type of Anesthesia, risks & benefits discussed:    Anesthesia Type: Gen Natural Airway  Intra-op Monitoring Plan: Standard ASA Monitors  Induction:  IV  Informed Consent: Informed consent signed with the Patient and all parties understand the risks and agree with anesthesia plan.  All questions answered.   ASA Score: 2  Day of Surgery " Review of History & Physical: H&P Update referred to the surgeon/provider.    Ready For Surgery From Anesthesia Perspective.     .

## 2024-05-06 ENCOUNTER — HOSPITAL ENCOUNTER (OUTPATIENT)
Facility: HOSPITAL | Age: 64
Discharge: HOME | End: 2024-05-06
Attending: ANESTHESIOLOGY | Admitting: ANESTHESIOLOGY
Payer: OTHER GOVERNMENT

## 2024-05-06 ENCOUNTER — ANESTHESIA (OUTPATIENT)
Dept: SURGERY | Facility: HOSPITAL | Age: 64
End: 2024-05-06
Payer: OTHER GOVERNMENT

## 2024-05-06 ENCOUNTER — TELEPHONE (OUTPATIENT)
Dept: PAIN MEDICINE | Facility: CLINIC | Age: 64
End: 2024-05-06
Payer: OTHER GOVERNMENT

## 2024-05-06 DIAGNOSIS — M54.9 CHRONIC BACK PAIN GREATER THAN 3 MONTHS DURATION: Primary | ICD-10-CM

## 2024-05-06 DIAGNOSIS — G89.29 CHRONIC BACK PAIN GREATER THAN 3 MONTHS DURATION: Primary | ICD-10-CM

## 2024-05-06 PROCEDURE — 37000008 HC ANESTHESIA 1ST 15 MINUTES: Performed by: ANESTHESIOLOGY

## 2024-05-06 PROCEDURE — 64635 DESTROY LUMB/SAC FACET JNT: CPT | Mod: RT | Performed by: ANESTHESIOLOGY

## 2024-05-06 PROCEDURE — 64635 DESTROY LUMB/SAC FACET JNT: CPT | Mod: RT,,, | Performed by: ANESTHESIOLOGY

## 2024-05-06 PROCEDURE — 63600175 PHARM REV CODE 636 W HCPCS: Performed by: ANESTHESIOLOGY

## 2024-05-06 PROCEDURE — 25000003 PHARM REV CODE 250: Performed by: ANESTHESIOLOGY

## 2024-05-06 PROCEDURE — D9220A PRA ANESTHESIA: Mod: ,,, | Performed by: NURSE ANESTHETIST, CERTIFIED REGISTERED

## 2024-05-06 PROCEDURE — 63600175 PHARM REV CODE 636 W HCPCS: Performed by: NURSE ANESTHETIST, CERTIFIED REGISTERED

## 2024-05-06 RX ORDER — TRIAMCINOLONE ACETONIDE 40 MG/ML
INJECTION, SUSPENSION INTRA-ARTICULAR; INTRAMUSCULAR
Status: DISCONTINUED
Start: 2024-05-06 | End: 2024-05-06 | Stop reason: HOSPADM

## 2024-05-06 RX ORDER — PROPOFOL 10 MG/ML
VIAL (ML) INTRAVENOUS CONTINUOUS PRN
Status: DISCONTINUED | OUTPATIENT
Start: 2024-05-06 | End: 2024-05-06

## 2024-05-06 RX ORDER — PROCHLORPERAZINE EDISYLATE 5 MG/ML
5 INJECTION INTRAMUSCULAR; INTRAVENOUS EVERY 30 MIN PRN
Status: DISCONTINUED | OUTPATIENT
Start: 2024-05-06 | End: 2024-05-06 | Stop reason: HOSPADM

## 2024-05-06 RX ORDER — TRIAMCINOLONE ACETONIDE 40 MG/ML
INJECTION, SUSPENSION INTRA-ARTICULAR; INTRAMUSCULAR
Status: DISCONTINUED | OUTPATIENT
Start: 2024-05-06 | End: 2024-05-06 | Stop reason: HOSPADM

## 2024-05-06 RX ORDER — ONDANSETRON 4 MG/1
8 TABLET, ORALLY DISINTEGRATING ORAL EVERY 6 HOURS PRN
Status: DISCONTINUED | OUTPATIENT
Start: 2024-05-06 | End: 2024-05-06 | Stop reason: HOSPADM

## 2024-05-06 RX ORDER — SODIUM CHLORIDE, SODIUM GLUCONATE, SODIUM ACETATE, POTASSIUM CHLORIDE AND MAGNESIUM CHLORIDE 30; 37; 368; 526; 502 MG/100ML; MG/100ML; MG/100ML; MG/100ML; MG/100ML
INJECTION, SOLUTION INTRAVENOUS CONTINUOUS
Status: DISCONTINUED | OUTPATIENT
Start: 2024-05-06 | End: 2024-05-06 | Stop reason: HOSPADM

## 2024-05-06 RX ORDER — BUPIVACAINE HYDROCHLORIDE 2.5 MG/ML
INJECTION, SOLUTION EPIDURAL; INFILTRATION; INTRACAUDAL
Status: DISCONTINUED | OUTPATIENT
Start: 2024-05-06 | End: 2024-05-06 | Stop reason: HOSPADM

## 2024-05-06 RX ORDER — LIDOCAINE HYDROCHLORIDE 10 MG/ML
INJECTION INFILTRATION; PERINEURAL
Status: DISCONTINUED | OUTPATIENT
Start: 2024-05-06 | End: 2024-05-06 | Stop reason: HOSPADM

## 2024-05-06 RX ORDER — MIDAZOLAM HYDROCHLORIDE 1 MG/ML
INJECTION INTRAMUSCULAR; INTRAVENOUS
Status: DISCONTINUED | OUTPATIENT
Start: 2024-05-06 | End: 2024-05-06

## 2024-05-06 RX ORDER — IPRATROPIUM BROMIDE AND ALBUTEROL SULFATE 2.5; .5 MG/3ML; MG/3ML
3 SOLUTION RESPIRATORY (INHALATION)
Status: DISCONTINUED | OUTPATIENT
Start: 2024-05-06 | End: 2024-05-06 | Stop reason: HOSPADM

## 2024-05-06 RX ORDER — ONDANSETRON HYDROCHLORIDE 2 MG/ML
4 INJECTION, SOLUTION INTRAVENOUS DAILY PRN
Status: DISCONTINUED | OUTPATIENT
Start: 2024-05-06 | End: 2024-05-06 | Stop reason: HOSPADM

## 2024-05-06 RX ORDER — LIDOCAINE HYDROCHLORIDE 10 MG/ML
1 INJECTION, SOLUTION EPIDURAL; INFILTRATION; INTRACAUDAL; PERINEURAL ONCE
Status: DISCONTINUED | OUTPATIENT
Start: 2024-05-06 | End: 2024-05-06 | Stop reason: HOSPADM

## 2024-05-06 RX ORDER — LIDOCAINE HYDROCHLORIDE 10 MG/ML
INJECTION INFILTRATION; PERINEURAL
Status: DISCONTINUED
Start: 2024-05-06 | End: 2024-05-06 | Stop reason: HOSPADM

## 2024-05-06 RX ORDER — MEPERIDINE HYDROCHLORIDE 25 MG/ML
12.5 INJECTION INTRAMUSCULAR; INTRAVENOUS; SUBCUTANEOUS EVERY 10 MIN PRN
Status: DISCONTINUED | OUTPATIENT
Start: 2024-05-06 | End: 2024-05-06 | Stop reason: HOSPADM

## 2024-05-06 RX ORDER — BUPIVACAINE HYDROCHLORIDE 2.5 MG/ML
INJECTION, SOLUTION EPIDURAL; INFILTRATION; INTRACAUDAL
Status: DISCONTINUED
Start: 2024-05-06 | End: 2024-05-06 | Stop reason: HOSPADM

## 2024-05-06 RX ADMIN — PROPOFOL 70 MCG/KG/MIN: 10 INJECTION, EMULSION INTRAVENOUS at 08:05

## 2024-05-06 RX ADMIN — MIDAZOLAM HYDROCHLORIDE 2 MG: 1 INJECTION, SOLUTION INTRAMUSCULAR; INTRAVENOUS at 08:05

## 2024-05-06 NOTE — ANESTHESIA POSTPROCEDURE EVALUATION
Anesthesia Post Evaluation    Patient: Jaxson Ferrell    Procedure(s) Performed: Procedure(s) (LRB):  RADIOFREQUENCY ABLATION Right L1-2 (Right)    Final Anesthesia Type: MAC      Patient location during evaluation: OPS  Patient participation: Yes- Able to Participate  Level of consciousness: awake and alert  Post-procedure vital signs: reviewed and stable  Pain management: adequate  Airway patency: patent    PONV status at discharge: No PONV  Anesthetic complications: no      Cardiovascular status: blood pressure returned to baseline  Respiratory status: unassisted and room air  Hydration status: euvolemic  Follow-up not needed.              Vitals Value Taken Time   /82 05/06/24 0814   Temp 36.3 °C (97.3 °F) 05/06/24 0814   Pulse 70 05/06/24 0814   Resp 15 05/06/24 0814   SpO2 96 % 05/06/24 0814         No case tracking events are documented in the log.      Pain/Nidhi Score: No data recorded

## 2024-05-06 NOTE — PLAN OF CARE
Patient resting quietly in bed.  Denies any complaints.  Vital signs stable and no signs of bleeding.  Discharge instructions given and patient verbalizes understanding.  Patient meets criteria for discharge.

## 2024-05-06 NOTE — TELEPHONE ENCOUNTER
I spoke with the patient to advise him of Dr. Bo' recommendations. He verbalized understanding and stated that would be easier for him if he could see Dr. Kapoor in [place]. I did advise him that if sx was needed, it would be performed in [facility]. He verbalized understanding and is okay with that. I scheduled him to see Dr. Kapoor on 6/10/24 at 10:00. He was compliant w date and time.

## 2024-05-06 NOTE — OP NOTE
Right L1-2 medial branch Radiofrequency Ablation    Pre-Procedure Diagnoses:  1. Chronic pain syndrome  2. Chronic Low back pain  3. Lumbar facet arthropathy    Post-Procedure Diagnoses:  1. Chronic pain syndrome  2. Chronic Low back pain  3. Lumbar facet arthropathy    Anesthesia:  Local and MAC    Estimated Blood Loss:  None    Complications:  None    Informed Consent:  The procedure, risks, benefits, and alternatives were discussed with the patient. There were no contraindications to the procedure. The patient expressed understanding and agreed to proceed. Fully informed written consent was obtained.     Description of the Procedure:  The patient was taken to the operating room. IV access was obtained prior to the start of the procedure. The patient was positioned prone on the fluoroscopy table. Continuous hemodynamic monitoring was initiated and continued throughout the duration of the procedure. The skin overlying the lumbosacral spine was prepped with Chloraprep and draped into a sterile field. Fluoroscopy was used to identify the locations of the right L1 and L2 medial branch nerves at the junctions of the superior articular processes and transverse processes of L2 and L3, respectively. Skin anesthesia was achieved using 1 mL of 1% lidocaine over each injection site. An 18-gauge 100 mm RF needle was slowly inserted and advanced under intermittent fluoroscopic guidance until it made bony contact at the target sites. Proper needle position was confirmed under AP, oblique, and lateral fluoroscopic views. Negative aspiration for blood or CSF was confirmed. An RF probe was placed through each needle. Impedence values were low. Motor testing was performed, which confirmed no stimulation of the lower extremity. Radiofrequency lesioning was then carried out at 80 degrees Celsius for 90 seconds at each level. The probes were withdrawn and each needle was injected with a combination of 0.5 ml of 0.25% bupivacaine and  10 mg of Kenalog. There was no pain on injection. The needles were removed intact and bleeding was nil. Sterile bandages were applied. The patient was taken to the recovery room for further observation in stable condition. The patient was then discharged home without any complications.

## 2024-05-06 NOTE — DISCHARGE SUMMARY
[facility] Surgical - Periop Services  Discharge Note  Short Stay    Procedure(s) (LRB):  RADIOFREQUENCY ABLATION Right L1-2 (Right)      OUTCOME: Patient tolerated treatment/procedure well without complication and is now ready for discharge.    DISPOSITION: Home or Self Care    FINAL DIAGNOSIS:  <principal problem not specified>    FOLLOWUP: In clinic    DISCHARGE INSTRUCTIONS:  No discharge procedures on file.     TIME SPENT ON DISCHARGE: 5 minutes

## 2024-05-06 NOTE — TELEPHONE ENCOUNTER
----- Message from Cari Stone sent at 5/3/2024  9:36 AM CDT -----  Regarding: CT Scan  Mr. Newton phoned the office today because he is scheduled for a CT scan on 05-14-24.  The CT scan is without contrast.  He wants to add contrast to the order, if you feel it's appropriate.  He wants to see everything and make sure his fusion is still ok.

## 2024-05-06 NOTE — TRANSFER OF CARE
"Anesthesia Transfer of Care Note    Patient: Jaxson Ferrell    Procedure(s) Performed: Procedure(s) (LRB):  RADIOFREQUENCY ABLATION Right L1-2 (Right)    Patient location: OPS    Anesthesia Type: MAC    Transport from OR: Transported from OR on room air with adequate spontaneous ventilation    Post pain: adequate analgesia    Post assessment: no apparent anesthetic complications    Post vital signs: stable    Level of consciousness: awake    Nausea/Vomiting: no nausea/vomiting    Complications: none    Transfer of care protocol was followed      Last vitals: Visit Vitals  /82   Pulse 70   Temp 36.3 °C (97.3 °F)   Resp 15   Ht 5' 10" (1.778 m)   Wt 88 kg (194 lb 0.1 oz)   SpO2 96%   BMI 27.84 kg/m²     "

## 2024-05-06 NOTE — TELEPHONE ENCOUNTER
The patient returned my call but I was unable to take it because I was on the other line. I called him back and inquired about the symptoms he is having. He stated it is the same symptoms that he was having back in 2021 whenever he seen Dr. Rivas. I did advise him that Dr. Rivas had wanted him to see a complex spine surgeon in [facility] to get recommendations. He stated driving to [facility] is really hard for him especially because of the pain. He did have an appointment with a neurosurgereon in [facility] but had to cancel it because it was at 8:30 and they could not get him a later time. I did get with Ann to advise of this and she recommended that I send this over to Dr. Bo to review to see if there is any recommendations he could give the patient. Of note, the patient has not had any recent testing since MRI's in 3/2022. He does have an upcoming CT scan scheduled for 5/14/24.

## 2024-05-07 VITALS
HEART RATE: 65 BPM | BODY MASS INDEX: 27.77 KG/M2 | TEMPERATURE: 98 F | WEIGHT: 194 LBS | SYSTOLIC BLOOD PRESSURE: 123 MMHG | HEIGHT: 70 IN | RESPIRATION RATE: 16 BRPM | OXYGEN SATURATION: 98 % | DIASTOLIC BLOOD PRESSURE: 73 MMHG

## 2024-05-21 ENCOUNTER — OFFICE VISIT (OUTPATIENT)
Dept: PAIN MEDICINE | Facility: CLINIC | Age: 64
End: 2024-05-21
Payer: OTHER GOVERNMENT

## 2024-05-21 VITALS
WEIGHT: 188 LBS | DIASTOLIC BLOOD PRESSURE: 95 MMHG | HEIGHT: 70 IN | HEART RATE: 89 BPM | SYSTOLIC BLOOD PRESSURE: 145 MMHG | BODY MASS INDEX: 26.92 KG/M2

## 2024-05-21 DIAGNOSIS — M54.16 LUMBAR RADICULOPATHY: ICD-10-CM

## 2024-05-21 DIAGNOSIS — M51.36 DDD (DEGENERATIVE DISC DISEASE), LUMBAR: ICD-10-CM

## 2024-05-21 DIAGNOSIS — M47.816 LUMBAR SPONDYLOSIS: Primary | ICD-10-CM

## 2024-05-21 PROCEDURE — 99213 OFFICE O/P EST LOW 20 MIN: CPT | Mod: ,,, | Performed by: NURSE PRACTITIONER

## 2024-05-21 NOTE — PROGRESS NOTES
"Subjective:      Patient ID: Jaxson Ferrell is a 64 y.o. male.    Chief Complaint: Follow-up (F/u procedure 5/6/24 C/O pain level 7, states procedure helped some, pt taking tylenol & Gabapentin for pain.)    Referred by: No ref. provider found     HPI: This is a 64-year-old male patient presenting as a postoperative follow-up for pain associated with lumbar spondylosis after receiving a right radiofrequency ablation L1-L2 on 05/06/2024.  Stated he has good pain relief to the right axial spine in this region after completing his RFA.  He does not have pain on his left lumbar axial spine region.  His pain level today is 7/10.  He has not been icing postop which could account for ongoing anticipated postop swelling.  Patient relayed that during his procedure Dr Buckley relayed to him that he could start with the thoracic pain due to facet arthropathy next if he was interested.  During the visit, patient relayed multiple times that he has completed 2 diagnostic medial branch blocks on the right from T10-T12.  Review of EMR shows 1 diagnostic medial branch block on the right T10-T12 on 03/18/2024.  Additionally patient relayed Dr. Buckley wanted him to complete a CT scan of his lumbar spine and follow-up with her.  This CT scan scheduled to be completed on 6/03/2024.  He does not need any medication refills and would like to follow-up withDr. Buckley 1st available after completing his lumbar CT and discuss results and plan of care.    Vital signs:   Vitals:    05/21/24 0832 05/21/24 0836   BP: (!) 145/95    Pulse: 89    Weight: 85.3 kg (188 lb)    Height: 5' 10" (1.778 m)    PainSc:    7     Body mass index is 26.98 kg/m².  Pain Disability Index (PDI): 45       Interventional Pain History  05/06/2024:  Right L1-L2 RFA  04/11/2024:  Right L1-3 MBB   03/18/2024:  Right T10-T12 MBB  0 2/01/2024: Right L1-L2 MBB    ROS: back pain     FINDINGS: Predental space appears normal. Vertebral body heights and  alignment " appear normal. Endplate degenerative changes noted. The  visualized posterior fossa is unremarkable. The visualized cord is  normal in size and signal. The paravertebral soft tissues appear  unremarkable.     Multilevel disc desiccation noted     C2-3: No disc herniation or bulge. No central canal, cord, or  foraminal compromise. Moderate right and mild left facet arthrosis and  hypertrophy seen.     C3-4: Severe disc space narrowing noted. There is moderate left and  mild right facet hypertrophy arthrosis noted. There is mild disc  osteophyte complex and bilateral uncovertebral hypertrophy noted.  There is partial effacement of CSF. There is no central canal  stenosis, cord deformation, or neuroforaminal stenosis.     C4-5: There is moderate bilateral facet arthrosis. There is mild  posterior disc ossify complex. There is partial effacement of ventral  CSF without central canal stenosis, foraminal stenosis, or cord  deformation.     C5-6: There is moderate disc space narrowing. There is mild posterior  disc osteophyte complex. There is mild bilateral facet arthrosis.  There is partial effacement of CSF without central canal stenosis,  cord deformation, neuroforaminal stenosis.     C6-7: There is mild disc bulge and bilateral uncovertebral hypertrophy  noted. Moderate disc space narrowing noted. There is no central canal  stenosis. There is mild/moderate right-sided foraminal stenosis.     C7-T1: No disc herniation or bulge. No canal, cord, or foraminal  compromise     IMPRESSION: Multilevel degenerative disc disease and facet  arthropathy. There is mild-to-moderate right-sided foraminal stenosis  at C6-7. No central canal stenosis, cord deformation, or cord signal  alteration evident. Please see above level by level for details.  Electronically Signed By: Terry Nichols MD  Date/Time Signed: 04/25/2022 11:35           Specimen Collected: 04/25/22 08:00 CDT Last Resulted: 04/25/22 08:41 CDT                  Objective:          Physical Exam    General: Well developed; normal weight.  A&O x 3; No anxiety/depression; NAD  Mental Status: Oriented to person, palce and time. Displays appropriate mood & affect.  Head: Norm cephalic and atraumatic  Neck:  No cervical paraspinal banding.  Full range of motion with lateral turning and cervical flexion +extension.  Eyes: normal conjunctiva, normal lids, normal pupils  ENT and mouth: normal external ear, nose, and no lesions noted on the lips.  Respiratory: Symmetrical, Unlabored. No dyspnea  CV: normal rhythm and rate. No peripheral edema.   Abdomen: Non-distended    Extremities:  Gen: No cyanosis or tenderness to palpation bilateral upper and lower extremities  Skin: Warm, pink, dry, no rashes, no lesions on the lumbar spine  Strength: 5/5 motor strength bilateral upper and lower extremities  ROM: Full ROM in bilateral knees and ankles without pain or instability.    Neuro:  Gait: no altalgic lean, normal toe and heel raise. Independent ambulator.  DTR's: 2+ in bilateral patellar, and ankle  Sensory: Intact to light touch bilateral  upper and lower extremities    Spine: Normal lordosis. No scoliosis  T+ L-spine ROM:  Limited and painful ROM to flexion, extension, bilateral rotation,   Straight Leg Raise:  Negative bilateral  SI Joint: No tenderness to palpation bilaterally.        Assessment:     This is a 64-year-old male patient presenting as a postoperative follow-up for pain associated with lumbar spondylosis after receiving a right radiofrequency ablation L1-L2 on 05/06/2024.  Stated he has good pain relief to the right axial spine in this region after completing his RFA.  He does not have pain on his left lumbar axial spine region.  His pain level today is 7/10.  He has not been icing postop which could account for ongoing anticipated postop swelling.  Patient relayed that during his procedure Dr Buckley relayed to him that he could start with the thoracic pain due to  "facet arthropathy next if he was interested.  During the visit, patient relayed multiple times that he has completed 2 diagnostic medial branch blocks on the right from T10-T12.  Review of EMR shows 1 diagnostic medial branch block on the right T10-T12 on 03/18/2024.  Additionally patient relayed Dr. Buckley wanted him to complete a CT scan of his lumbar spine and follow-up with her.  This CT scan scheduled to be completed on 6/03/2024.  He does not need any medication refills and would like to follow-up withDr. uBckley 1st available after completing his lumbar CT and discuss results and plan of care.    Plan of care:  Patient to complete lumbar CT spine imaging  Follow-up appointment [place] for patient to be seen by Dr. Buckley in her next available slot to discuss imaging results and plan of care.    Encounter Diagnoses   Name Primary?    Lumbar spondylosis Yes    DDD (degenerative disc disease), lumbar     Lumbar radiculopathy          Plan:       Jaxson Gannon" was seen today for follow-up.    Diagnoses and all orders for this visit:    Lumbar spondylosis    DDD (degenerative disc disease), lumbar    Lumbar radiculopathy                 "

## 2024-06-03 ENCOUNTER — HOSPITAL ENCOUNTER (OUTPATIENT)
Dept: RADIOLOGY | Facility: HOSPITAL | Age: 64
Discharge: HOME | End: 2024-06-03
Attending: ANESTHESIOLOGY
Payer: OTHER GOVERNMENT

## 2024-06-03 DIAGNOSIS — M54.9 CHRONIC BACK PAIN GREATER THAN 3 MONTHS DURATION: ICD-10-CM

## 2024-06-03 DIAGNOSIS — G89.29 CHRONIC BACK PAIN GREATER THAN 3 MONTHS DURATION: ICD-10-CM

## 2024-06-03 DIAGNOSIS — M47.816 LUMBAR SPONDYLOSIS: ICD-10-CM

## 2024-06-03 DIAGNOSIS — M54.9 DORSALGIA, UNSPECIFIED: ICD-10-CM

## 2024-06-03 PROCEDURE — 72131 CT LUMBAR SPINE W/O DYE: CPT | Mod: TC

## 2024-06-10 ENCOUNTER — OFFICE VISIT (OUTPATIENT)
Dept: NEUROSURGERY | Facility: CLINIC | Age: 64
End: 2024-06-10
Payer: OTHER GOVERNMENT

## 2024-06-10 VITALS
RESPIRATION RATE: 16 BRPM | WEIGHT: 195 LBS | SYSTOLIC BLOOD PRESSURE: 137 MMHG | BODY MASS INDEX: 27.92 KG/M2 | HEART RATE: 80 BPM | HEIGHT: 70 IN | DIASTOLIC BLOOD PRESSURE: 89 MMHG

## 2024-06-10 DIAGNOSIS — M48.00 SPINAL STENOSIS, UNSPECIFIED SPINAL REGION: Primary | Chronic | ICD-10-CM

## 2024-06-10 PROCEDURE — 99214 OFFICE O/P EST MOD 30 MIN: CPT | Mod: ,,, | Performed by: STUDENT IN AN ORGANIZED HEALTH CARE EDUCATION/TRAINING PROGRAM

## 2024-06-10 NOTE — PROGRESS NOTES
Neurosurgery  Established Patient    SUBJECTIVE:     History of Present Illness:  64 M who is s/p remote L4-S1 fusion presents in fu for worsening right sided low back pain.  He has been going to pain mgmt for injections to treat this with limited relief of his pain.  He describes right sided cramping low back pain which will wake him from sleep.  Activity can help alleviate the pain however stretching makes it worse.  He also endorses BLE radicular leg pain into his calves and down into his left foot.  This leg pain is made worse with standing and relieved with leaning forward.  He also feels that his legs are constantly vibrating.  He notes some RUE radicular pain into his dorsal forearm and mild progressive hand weakness.    Review of patient's allergies indicates:  No Known Allergies    Current Outpatient Medications   Medication Sig Dispense Refill    acetaminophen (TYLENOL) 650 MG TbSR Take 650 mg by mouth 2 (two) times daily.      atorvastatin (LIPITOR) 40 MG tablet Take 40 mg by mouth once daily.      baclofen (LIORESAL) 10 MG tablet Take 10 mg by mouth 2 (two) times daily.      co-enzyme Q-10 30 mg capsule Take 30 mg by mouth once daily.      gabapentin (NEURONTIN) 800 MG tablet Take 800 mg by mouth 3 (three) times daily.      loratadine-pseudoephedrine 5-120 mg (CLARITIN-D 12-HOUR) 5-120 mg per tablet Take 1 tablet by mouth daily as needed.      nabumetone (RELAFEN) 500 MG tablet Take 750 mg by mouth 2 (two) times daily as needed for Pain.      PROLIA 60 mg/mL Syrg Inject 60 mg into the skin every 6 (six) months.      tamsulosin (FLOMAX) 0.4 mg Cap Take 1 capsule by mouth once daily.      vitamin D (VITAMIN D3) 1000 units Tab Take 5,000 Units by mouth 3 (three) times a week.       No current facility-administered medications for this visit.       Past Medical History:   Diagnosis Date    Kidney stones     Mixed hyperlipidemia     Osteoporosis     Spinal stenosis     thoracic    Subluxation of patella       Past Surgical History:   Procedure Laterality Date    COLONOSCOPY N/A 12/06/2022    Varghese Padilla    CYSTOSCOPY W/ LASER LITHOTRIPSY      EPIDURAL STEROID INJECTION INTO THORACIC SPINE N/A 03/20/2023    Procedure: INJECTION, STEROID, SPINE, THORACIC, EPIDURAL T10-11 interlaminar;  Surgeon: Autumn Buckley MD;  Location: [facility] OR;  Service: Pain Management;  Laterality: N/A;  T10-11 interlaminar    INJECTION OF ANESTHETIC AGENT AROUND MEDIAL BRANCH NERVES INNERVATING CERVICAL FACET JOINT Right 11/16/2023    Procedure: BLOCK, NERVE, FACET JOINT, CERVICAL, MEDIAL BRANCH Right MBB T10-12;  Surgeon: Autumn Buckley MD;  Location: [facility] OR;  Service: Pain Management;  Laterality: Right;    INJECTION OF ANESTHETIC AGENT AROUND MEDIAL BRANCH NERVES INNERVATING CERVICAL FACET JOINT Right 03/18/2024    Procedure: BLOCK, NERVE, FACET JOINT, CERVICAL, MEDIAL BRANCH Right  T10-12 no sedation;  Surgeon: Autumn Buckley MD;  Location: [facility] OR;  Service: Pain Management;  Laterality: Right;  Right MBB T10-12 no sedation    INJECTION OF ANESTHETIC AGENT AROUND MEDIAL BRANCH NERVES INNERVATING LUMBAR FACET JOINT Right 02/01/2024    Procedure: BLOCK, NERVE, FACET JOINT, LUMBAR, MEDIAL BRANCH Right L1-2   Patient does not want sedation;  Surgeon: Autumn Buckley MD;  Location: [facility] OR;  Service: Pain Management;  Laterality: Right;  Right MBB L1-2    INJECTION OF ANESTHETIC AGENT AROUND MEDIAL BRANCH NERVES INNERVATING LUMBAR FACET JOINT Right 04/11/2024    Procedure: BLOCK, NERVE, FACET JOINT, LUMBAR, MEDIAL BRANCH Right L1 L3  no ansesthesia;  Surgeon: Autumn Buckley MD;  Location: [facility] OR;  Service: Pain Management;  Laterality: Right;  MBB Rt L1-L3 *No Anesthesia*    RADIOFREQUENCY ABLATION Right 5/6/2024    Procedure: RADIOFREQUENCY ABLATION Right L1-2;  Surgeon: Autumn Buckley MD;  Location: [facility] OR;  Service: Pain Management;  Laterality: Right;    spinal injections      SPINE SURGERY      fusion 2 levels with  "rods and screws, anterior approach    TONSILLECTOMY      turbinate reduction       Family History       Problem Relation (Age of Onset)    Breast cancer Mother    Colon cancer Father    Coronary artery disease Father    Prostate cancer Father          Social History     Socioeconomic History    Marital status: Single   Tobacco Use    Smoking status: Former     Current packs/day: 0.00     Types: Cigarettes     Quit date: 3/2/2010     Years since quittin.2    Smokeless tobacco: Never   Substance and Sexual Activity    Alcohol use: Yes     Alcohol/week: 3.0 standard drinks of alcohol     Types: 3 Glasses of wine per week     Comment: WINE 2 to 3 times week    Drug use: Not Currently    Sexual activity: Yes     Partners: Female     Social Determinants of Health     Financial Resource Strain: Low Risk  (2024)    Overall Financial Resource Strain (CARDIA)     Difficulty of Paying Living Expenses: Not hard at all   Food Insecurity: No Food Insecurity (2024)    Hunger Vital Sign     Worried About Running Out of Food in the Last Year: Never true     Ran Out of Food in the Last Year: Never true   Transportation Needs: No Transportation Needs (2024)    TRANSPORTATION NEEDS     Transportation : No   Physical Activity: Sufficiently Active (2024)    Exercise Vital Sign     Days of Exercise per Week: 7 days     Minutes of Exercise per Session: 60 min   Stress: No Stress Concern Present (2024)    [employer] [place] [employer] - Occupational Stress Questionnaire     Feeling of Stress : Not at all   Housing Stability: Low Risk  (2024)    Housing Stability Vital Sign     Unable to Pay for Housing in the Last Year: No     Homeless in the Last Year: No       Review of Systems  14 point ROS was negative    OBJECTIVE:     Vital Signs  Pulse: 80  Resp: 16  BP: 137/89  Pain Score:   7  Height: 5' 10" (177.8 cm)  Weight: 88.5 kg (195 lb)  Body mass index is 27.98 kg/m².    Physical " Exam:    Constitutional: He appears well-developed and well-nourished.     Eyes: Pupils are equal, round, and reactive to light.     Cardiovascular: Normal rate and regular rhythm.     Abdominal: Soft.     Psych/Behavior: He is alert. He is oriented to person, place, and time. He has a normal mood and affect.     Musculoskeletal: Gait is abnormal.        Neck: Range of motion is limited.        Back: Range of motion is limited.     Neurological:        DTRs: Bicep reflexes are 3+ on the right side and 3+ on the left side. Patellar reflexes are 2+ on the right side and 2+ on the left side.        Cranial nerves: Cranial nerve(s) II, III, IV, V, VI, VII, VIII, IX, X, XI and XII are intact.     5/5 in BUE/BLE except 4/5 in right DF/EHL  SILT    +connolly's bilaterally  +right mid lumbar pain to palpation    Diagnostic Results:  CT L spine: s/p L4-S1 fusion.  No failure, solid fusion.  No significant facet arthropathy throughout visualized spine.  No fractures.  Reviewed    ASSESSMENT/PLAN:     64 M with hx of prior L4-S1 fusion who presents with worsening right sided low back pain, RUE radicular pain, BLE radicular pain left worse than right.  Clinically, he has some signs of cervical myelopathy so will plan to get MRI cervical spine, flex/ex c spine.  Will plan to also get updated MRI T/L spine, scoli, flex/ex L.  Pt may fu when completed.

## 2024-06-11 ENCOUNTER — OFFICE VISIT (OUTPATIENT)
Dept: PAIN MEDICINE | Facility: CLINIC | Age: 64
End: 2024-06-11
Payer: OTHER GOVERNMENT

## 2024-06-11 VITALS
SYSTOLIC BLOOD PRESSURE: 129 MMHG | WEIGHT: 195 LBS | BODY MASS INDEX: 27.92 KG/M2 | DIASTOLIC BLOOD PRESSURE: 91 MMHG | HEART RATE: 81 BPM | HEIGHT: 70 IN

## 2024-06-11 DIAGNOSIS — M54.9 CHRONIC BACK PAIN GREATER THAN 3 MONTHS DURATION: Primary | ICD-10-CM

## 2024-06-11 DIAGNOSIS — G89.29 CHRONIC BACK PAIN GREATER THAN 3 MONTHS DURATION: Primary | ICD-10-CM

## 2024-06-11 PROCEDURE — 99213 OFFICE O/P EST LOW 20 MIN: CPT | Mod: ,,, | Performed by: ANESTHESIOLOGY

## 2024-06-11 NOTE — PROGRESS NOTES
Autumn Buckley MD        PATIENT NAME: Jaxson Ferrell  : 1960  DATE: 24  MRN: [id number]      Billing Provider: Autumn Buckley MD  Level of Service:   Patient PCP Information       Provider PCP Type    Anshul East MD General            Reason for Visit / Chief Complaint: Follow-up (F/u CT scan L spine results & care plan C/O pain level 7, OTC pain meds)       Update PCP  Update Chief Complaint         History of Present Illness / Problem Focused Workflow     Jaxson Ferrell presents to the clinic with Follow-up (F/u CT scan L spine results & care plan C/O pain level 7, OTC pain meds)     This is a 64-year-old male who presents to clinic today for follow up of his chronic right-sided lower thoracic and lumbar back pain.  Most recently, he underwent right L1-3 diagnostic medial branch blocks about 2 weeks ago.  He reports 30% improvement after these injections.  He had previously undergone right T10-12 diagnostic medial branch blocks on 2 separate occasions.  He reported 50% improvement of his pain after those injections.  He had a consultation with a new neurosurgeon yesterday and has several MRIs and x-rays scheduled for later this month.  He will then follow up with the neurosurgeon in July.        Back Pain  Associated symptoms include leg pain and numbness.   Low-back Pain  Associated symptoms include leg pain and numbness.   Mid-back Pain  Associated symptoms include leg pain and numbness.   Follow-up  Associated symptoms include numbness.       Review of Systems     Review of Systems   Musculoskeletal:  Positive for back pain and leg pain.   Neurological:  Positive for numbness.   All other systems reviewed and are negative.       Medical / Social / Family History     Past Medical History:   Diagnosis Date    Kidney stones     Mixed hyperlipidemia     Osteoporosis     Spinal stenosis     thoracic    Subluxation of patella        Past Surgical History:   Procedure Laterality Date     COLONOSCOPY N/A 12/06/2022    Varghese Valerie    CYSTOSCOPY W/ LASER LITHOTRIPSY      EPIDURAL STEROID INJECTION INTO THORACIC SPINE N/A 03/20/2023    Procedure: INJECTION, STEROID, SPINE, THORACIC, EPIDURAL T10-11 interlaminar;  Surgeon: Autumn Buckley MD;  Location: [facility] OR;  Service: Pain Management;  Laterality: N/A;  T10-11 interlaminar    INJECTION OF ANESTHETIC AGENT AROUND MEDIAL BRANCH NERVES INNERVATING CERVICAL FACET JOINT Right 11/16/2023    Procedure: BLOCK, NERVE, FACET JOINT, CERVICAL, MEDIAL BRANCH Right MBB T10-12;  Surgeon: Autumn Buckley MD;  Location: [facility] OR;  Service: Pain Management;  Laterality: Right;    INJECTION OF ANESTHETIC AGENT AROUND MEDIAL BRANCH NERVES INNERVATING CERVICAL FACET JOINT Right 03/18/2024    Procedure: BLOCK, NERVE, FACET JOINT, CERVICAL, MEDIAL BRANCH Right  T10-12 no sedation;  Surgeon: Autumn Buckley MD;  Location: [facility] OR;  Service: Pain Management;  Laterality: Right;  Right MBB T10-12 no sedation    INJECTION OF ANESTHETIC AGENT AROUND MEDIAL BRANCH NERVES INNERVATING LUMBAR FACET JOINT Right 02/01/2024    Procedure: BLOCK, NERVE, FACET JOINT, LUMBAR, MEDIAL BRANCH Right L1-2   Patient does not want sedation;  Surgeon: Autumn Buckley MD;  Location: [facility] OR;  Service: Pain Management;  Laterality: Right;  Right MBB L1-2    INJECTION OF ANESTHETIC AGENT AROUND MEDIAL BRANCH NERVES INNERVATING LUMBAR FACET JOINT Right 04/11/2024    Procedure: BLOCK, NERVE, FACET JOINT, LUMBAR, MEDIAL BRANCH Right L1 L3  no ansesthesia;  Surgeon: Autumn Buckley MD;  Location: [facility] OR;  Service: Pain Management;  Laterality: Right;  MBB Rt L1-L3 *No Anesthesia*    RADIOFREQUENCY ABLATION Right 5/6/2024    Procedure: RADIOFREQUENCY ABLATION Right L1-2;  Surgeon: Autumn Buckley MD;  Location: [facility] OR;  Service: Pain Management;  Laterality: Right;    spinal injections      SPINE SURGERY      fusion 2 levels with rods and screws, anterior approach    TONSILLECTOMY       turbinate reduction         Social History  Mr. Ferrell  reports that he quit smoking about 14 years ago. His smoking use included cigarettes. He has never used smokeless tobacco. He reports current alcohol use of about 3.0 standard drinks of alcohol per week. He reports that he does not currently use drugs.    Family History  Mr.'s Ferrell family history includes Breast cancer in his mother; Colon cancer in his father; Coronary artery disease in his father; Prostate cancer in his father.    Medications and Allergies     Medications  Outpatient Medications Marked as Taking for the 6/11/24 encounter (Office Visit) with Autumn Buckley MD   Medication Sig Dispense Refill    acetaminophen (TYLENOL) 650 MG TbSR Take 650 mg by mouth 2 (two) times daily.      atorvastatin (LIPITOR) 40 MG tablet Take 40 mg by mouth once daily.      baclofen (LIORESAL) 10 MG tablet Take 10 mg by mouth 2 (two) times daily.      co-enzyme Q-10 30 mg capsule Take 30 mg by mouth once daily.      gabapentin (NEURONTIN) 800 MG tablet Take 800 mg by mouth 3 (three) times daily.      loratadine-pseudoephedrine 5-120 mg (CLARITIN-D 12-HOUR) 5-120 mg per tablet Take 1 tablet by mouth daily as needed.      nabumetone (RELAFEN) 500 MG tablet Take 750 mg by mouth 2 (two) times daily as needed for Pain.      PROLIA 60 mg/mL Syrg Inject 60 mg into the skin every 6 (six) months.      tamsulosin (FLOMAX) 0.4 mg Cap Take 1 capsule by mouth once daily.      vitamin D (VITAMIN D3) 1000 units Tab Take 5,000 Units by mouth 3 (three) times a week.         Allergies  Review of patient's allergies indicates:  No Known Allergies    Physical Examination     Vitals:    06/11/24 1530   BP: (!) 129/91   Pulse: 81     Spine Musculoskeletal Exam    Gait    Gait is normal.    Inspection    Thoracolumbar        Prior incision: lateral lumbar    Incision: clean, dry, intact and well-healed    Incisional drainage: none    Palpation    Thoracolumbar    Tenderness:  present      Paraspinous: right    Right      Masses: none      Spasms: none      Crepitus: none      Muscle tone: increased    Left      Masses: none      Spasms: none      Crepitus: none      Muscle tone: normal    Range of Motion    Thoracolumbar        Thoracolumbar range of motion additional comments: Restricted range of motion in the lumbar spine secondary to pain.  Positive facet loading.    Strength    Thoracolumbar    Thoracolumbar motor exam is normal.       Sensory    Thoracolumbar    Thoracolumbar sensation is normal.    General      Constitutional: appears stated age, well-developed and well-nourished    Scleral icterus: no    Labored breathing: no    Psychiatric: normal mood and affect and no acute distress    Neurological: alert and oriented x3    Skin: intact    Lymphadenopathy: none  CV: extremities warm, well-perfused  Resp: nonlabored breathing      Assessment and Plan (including Health Maintenance)      Problem List  Smart Sets  Document Outside HM   :    Plan:   Chronic back pain greater than 3 months duration       We are going to wait for him to have his new imaging done later this month and follow up with his neurosurgeon.  He would still like to consider T10-12 medial branch radiofrequency ablations once this has been done.    Problem List Items Addressed This Visit          Orthopedic    Chronic back pain greater than 3 months duration - Primary         Future Appointments   Date Time Provider Department Center   6/27/2024  8:15 AM [facility] MRI1 275 LB LIMIT [facility] [place]   6/27/2024  9:15 AM [facility] MRI1 275 [facility]IT [facility] [place]   6/27/2024 10:15 AM [facility] MRI1 275 LB LIMIT [facility] [place]   6/27/2024 11:30 AM [facility] XR1 [facility] [place]   6/27/2024 11:45 AM [facility] X[facility] [place]   7/8/2024 10:00 AM Catalino Kapoor DO [facility] [place]   7/23/2024  9:15 AM Autumn Buckley MD [facility] [place]   5/6/2025  8:40 AM Anshul Ly  MD [place] [id number]        There are no Patient Instructions on file for this visit.  No follow-ups on file.     Signature:  Autunm Buckley MD      Date of encounter: 6/11/24

## 2024-06-18 ENCOUNTER — TELEPHONE (OUTPATIENT)
Dept: NEUROSURGERY | Facility: CLINIC | Age: 64
End: 2024-06-18
Payer: OTHER GOVERNMENT

## 2024-06-18 DIAGNOSIS — M48.07 SPINAL STENOSIS, LUMBOSACRAL REGION: ICD-10-CM

## 2024-06-18 DIAGNOSIS — M54.6 PAIN IN THORACIC SPINE: ICD-10-CM

## 2024-06-18 DIAGNOSIS — M48.02 SPINAL STENOSIS, CERVICAL REGION: Primary | ICD-10-CM

## 2024-06-18 DIAGNOSIS — M51.34 DEGENERATIVE DISC DISEASE, THORACIC: ICD-10-CM

## 2024-06-18 NOTE — TELEPHONE ENCOUNTER
"Patient called inquiring about if his MRI cervical, thoracic, & lumbar scans that were scheduled on 06/27 at [facility] were scheduled with sedation. I asked if he meant IV sedation or Valium. He stated that "I ALWAYS get IV sedation, propofol as a matter of fact, but whatever IV sedation is fine as long as it is IV sedation. Especially since it is going to be my entire spine." I told him I would try to reschedule it and check off that he would need IV sedation, he asked that I call the radiology department and verify that it is indeed with the IV sedation. I called today and [facility] verified that it was not with sedation nor do they perform MRIs with IV sedation there and that I would have to call central scheduling. I called central scheduling, but the girl that I spoke to stated that she isn't sure if they can get him in prior to his f/u appt. Can you please advise patient that he will have to be r/s with Dr. Kapoor until after his scans are scheduled due to wanting IV sedation, it is only performed 2x a week and they are booked out according to central scheduling; you'll have to call them to get him scheduled at their soonest availability. It has to be done at main campus unless he is okay with just taking Valium then he can be scheduled back at the [facility].  "

## 2024-06-18 NOTE — TELEPHONE ENCOUNTER
----- Message from Rosanna Macdonald sent at 6/18/2024  2:22 PM CDT -----  Regarding: MRI Lumbar Spine Without Contrast,MRI Thoracic Spine Without Contrast, and  MRI  Cervical Spine Without Contrast  MRI Lumbar Spine Without Contrast,MRI Thoracic Spine Without Contrast, and  MRI  Cervical Spine Without Contrast all need to be reordered with Sedation according to the notes. Please reorder with sedation so that we may schedule.        Thank You,Rosanna Macdonald  Centralized Scheduling Dept

## 2024-07-22 ENCOUNTER — TELEPHONE (OUTPATIENT)
Dept: INTERNAL MEDICINE | Facility: CLINIC | Age: 64
End: 2024-07-22
Payer: OTHER GOVERNMENT

## 2024-07-22 NOTE — TELEPHONE ENCOUNTER
----- Message from Phuong Rivas sent at 7/22/2024 11:41 AM CDT -----  Regarding: medical advice  Who Called: Jaxson Ferrell    Preferred Method of Contact: Phone Call    Patient's Preferred Phone Number on File: [phone]     Best Call Back Number, if different:    Additional Information: Jaxson is scheduled for an appt with Dr East to be checked prior to his MRI(they putting him to sleep for it).  He inquired about blood work and request a call back.

## 2024-07-25 ENCOUNTER — OFFICE VISIT (OUTPATIENT)
Dept: INTERNAL MEDICINE | Facility: CLINIC | Age: 64
End: 2024-07-25
Payer: OTHER GOVERNMENT

## 2024-07-25 ENCOUNTER — ANESTHESIA EVENT (OUTPATIENT)
Dept: SURGERY | Facility: HOSPITAL | Age: 64
End: 2024-07-25
Payer: OTHER GOVERNMENT

## 2024-07-25 VITALS
HEART RATE: 79 BPM | WEIGHT: 195 LBS | SYSTOLIC BLOOD PRESSURE: 124 MMHG | DIASTOLIC BLOOD PRESSURE: 80 MMHG | HEIGHT: 70 IN | BODY MASS INDEX: 27.92 KG/M2 | RESPIRATION RATE: 18 BRPM | OXYGEN SATURATION: 99 %

## 2024-07-25 DIAGNOSIS — M54.9 CHRONIC BACK PAIN GREATER THAN 3 MONTHS DURATION: Primary | ICD-10-CM

## 2024-07-25 DIAGNOSIS — G89.29 CHRONIC BACK PAIN GREATER THAN 3 MONTHS DURATION: Primary | ICD-10-CM

## 2024-07-25 DIAGNOSIS — Z01.818 PREOPERATIVE CLEARANCE: ICD-10-CM

## 2024-07-25 NOTE — ASSESSMENT & PLAN NOTE
I have reviewed all his recent data recent laboratory studies an EKG was done here in the office this is normal   Today he is ready for anesthesia as scheduled for his MRI procedure.

## 2024-07-25 NOTE — PROGRESS NOTES
Anshul East MD        PATIENT NAME: Jaxson Ferrell  : 1960  DATE: 24  MRN: [id number]      Billing Provider: Anshul East MD  Level of Service:   Patient PCP Information       Provider PCP Type    Anshul East MD General            Reason for Visit / Chief Complaint: Pre-op Exam       Update PCP  Update Chief Complaint         History of Present Illness / Problem Focused Workflow     Jaxson Ferrell presents to the clinic with Pre-op Exam     Aman is here for pre anesthesia evaluation   He was significant back pain evaluations is going to require an extended MRI of his spine   To his inability to lay without moving he will need anesthesia   He was asked to come here for evaluation prior to anesthesia   He is feeling well with no shortness a breath chest pain.        Review of Systems   Review of Systems   Constitutional: Negative.    HENT: Negative.     Eyes: Negative.    Respiratory: Negative.     Cardiovascular: Negative.    Gastrointestinal: Negative.    Endocrine: Negative.    Genitourinary: Negative.    Musculoskeletal:  Positive for back pain.   Integumentary:  Negative.   Neurological: Negative.    Psychiatric/Behavioral: Negative.          Medical / Social / Family History     Past Medical History:   Diagnosis Date    Chronic pain     Kidney stones     Osteoporosis     Spinal stenosis     thoracic       Past Surgical History:   Procedure Laterality Date    APPENDECTOMY      COLONOSCOPY N/A 2022    Varghese Rizohire    CYSTOSCOPY W/ LASER LITHOTRIPSY      EPIDURAL STEROID INJECTION INTO THORACIC SPINE N/A 2023    Procedure: INJECTION, STEROID, SPINE, THORACIC, EPIDURAL T10-11 interlaminar;  Surgeon: Autumn Buckley MD;  Location: [facility] OR;  Service: Pain Management;  Laterality: N/A;  T10-11 interlaminar    INJECTION OF ANESTHETIC AGENT AROUND MEDIAL BRANCH NERVES INNERVATING CERVICAL FACET JOINT Right 2023    Procedure: BLOCK, NERVE, FACET JOINT,  CERVICAL, MEDIAL BRANCH Right MBB T10-12;  Surgeon: Autumn Buckley MD;  Location: [facility] OR;  Service: Pain Management;  Laterality: Right;    INJECTION OF ANESTHETIC AGENT AROUND MEDIAL BRANCH NERVES INNERVATING CERVICAL FACET JOINT Right 03/18/2024    Procedure: BLOCK, NERVE, FACET JOINT, CERVICAL, MEDIAL BRANCH Right  T10-12 no sedation;  Surgeon: Autumn Buckley MD;  Location: [facility] OR;  Service: Pain Management;  Laterality: Right;  Right MBB T10-12 no sedation    INJECTION OF ANESTHETIC AGENT AROUND MEDIAL BRANCH NERVES INNERVATING LUMBAR FACET JOINT Right 02/01/2024    Procedure: BLOCK, NERVE, FACET JOINT, LUMBAR, MEDIAL BRANCH Right L1-2   Patient does not want sedation;  Surgeon: Autumn Buckley MD;  Location: [facility] OR;  Service: Pain Management;  Laterality: Right;  Right MBB L1-2    INJECTION OF ANESTHETIC AGENT AROUND MEDIAL BRANCH NERVES INNERVATING LUMBAR FACET JOINT Right 04/11/2024    Procedure: BLOCK, NERVE, FACET JOINT, LUMBAR, MEDIAL BRANCH Right L1 L3  no ansesthesia;  Surgeon: Autumn Buckley MD;  Location: [facility] OR;  Service: Pain Management;  Laterality: Right;  MBB Rt L1-L3 *No Anesthesia*    RADIOFREQUENCY ABLATION Right 05/06/2024    Procedure: RADIOFREQUENCY ABLATION Right L1-2;  Surgeon: Autumn Buckley MD;  Location: [facility] OR;  Service: Pain Management;  Laterality: Right;    spinal injections      SPINE SURGERY      fusion 2 levels with rods and screws, anterior approach    TONSILLECTOMY      turbinate reduction         Social History  Mr. Ferrell  reports that he quit smoking about 14 years ago. His smoking use included cigarettes. He has never used smokeless tobacco. He reports current alcohol use of about 3.0 standard drinks of alcohol per week. He reports that he does not currently use drugs.    Family History  Mr.'s Ferrell  family history includes Breast cancer in his mother; Colon cancer in his father; Coronary artery disease in his father; Prostate cancer in his  father.    Medications and Allergies     Medications  Outpatient Medications Marked as Taking for the 7/25/24 encounter (Office Visit) with Anshul Ly MD   Medication Sig Dispense Refill    acetaminophen (TYLENOL) 650 MG TbSR Take 650 mg by mouth 2 (two) times daily.      atorvastatin (LIPITOR) 40 MG tablet Take 40 mg by mouth once daily.      baclofen (LIORESAL) 10 MG tablet Take 10 mg by mouth 2 (two) times daily.      co-enzyme Q-10 30 mg capsule Take 30 mg by mouth once daily.      gabapentin (NEURONTIN) 800 MG tablet Take 800 mg by mouth 3 (three) times daily.      loratadine-pseudoephedrine 5-120 mg (CLARITIN-D 12-HOUR) 5-120 mg per tablet Take 1 tablet by mouth daily as needed.      nabumetone (RELAFEN) 500 MG tablet Take 750 mg by mouth 2 (two) times daily as needed for Pain.      PROLIA 60 mg/mL Syrg Inject 60 mg into the skin every 6 (six) months.      tamsulosin (FLOMAX) 0.4 mg Cap Take 1 capsule by mouth once daily.      vitamin D (VITAMIN D3) 1000 units Tab Take 5,000 Units by mouth 3 (three) times a week.         Allergies  Review of patient's allergies indicates:  No Known Allergies    Physical Examination     Vitals:    07/25/24 1407   BP: 124/80   Pulse: 79   Resp: 18     Physical Exam  Vitals reviewed.   Constitutional:       Appearance: Normal appearance.   HENT:      Head: Normocephalic.      Right Ear: Tympanic membrane normal.      Left Ear: Tympanic membrane normal.      Nose: Nose normal.      Mouth/Throat:      Pharynx: Oropharynx is clear.   Eyes:      Extraocular Movements: Extraocular movements intact.      Pupils: Pupils are equal, round, and reactive to light.   Cardiovascular:      Rate and Rhythm: Normal rate and regular rhythm.      Pulses: Normal pulses.      Heart sounds: Normal heart sounds.   Pulmonary:      Effort: Pulmonary effort is normal.      Breath sounds: Normal breath sounds.   Abdominal:      General: Abdomen is flat. Bowel sounds are normal.      Palpations:  Abdomen is soft.   Musculoskeletal:         General: Normal range of motion.      Cervical back: Normal range of motion.      Comments: Significant back pain with limited range of motion   Skin:     General: Skin is warm and dry.   Neurological:      General: No focal deficit present.      Mental Status: He is alert and oriented to person, place, and time.   Psychiatric:         Mood and Affect: Mood normal.         Behavior: Behavior normal.          Assessment and Plan (including Health Maintenance)      Problem List  Smart Sets  Document Outside HM   :    Plan:    ICD-10-CM ICD-9-CM   1. Chronic back pain greater than 3 months duration  M54.9 724.5    G89.29 338.29   2. Preoperative clearance  Z01.818 V72.84       Problem List Items Addressed This Visit          Orthopedic    Chronic back pain greater than 3 months duration - Primary     Significant back pain   I reviewed the notes from his ordering physician   Extended MRI ordered.            Other    Preoperative clearance     I have reviewed all his recent data recent laboratory studies an EKG was done here in the office this is normal   Today he is ready for anesthesia as scheduled for his MRI procedure.                     Health Maintenance Due   Topic Date Due    Hepatitis C Screening  Never done    HIV Screening  Never done    TETANUS VACCINE  Never done    RSV Vaccine (Age 60+ and Pregnant patients) (1 - 1-dose 60+ series) Never done    COVID-19 Vaccine (5 - 2023-24 season) 09/01/2023    Pneumococcal Vaccines (Age 65+) (1 of 1 - PCV) Never done    Abdominal Aortic Aneurysm Screening  Never done       Problem List Items Addressed This Visit          Orthopedic    Chronic back pain greater than 3 months duration - Primary    Current Assessment & Plan     Significant back pain   I reviewed the notes from his ordering physician   Extended MRI ordered.            Other    Preoperative clearance    Current Assessment & Plan     I have reviewed all his recent  data recent laboratory studies an EKG was done here in the office this is normal   Today he is ready for anesthesia as scheduled for his MRI procedure.              Health Maintenance Topics with due status: Not Due       Topic Last Completion Date    Colorectal Cancer Screening 12/06/2022    Hemoglobin A1c (Diabetic Prevention Screening) 03/19/2024    Lipid Panel 03/19/2024    Influenza Vaccine Not Due       Future Appointments   Date Time Provider Department Center   7/31/2024  8:30 AM [facility] XR1  770 LB LIMIT [facility]   7/31/2024  8:45 AM [facility] XR1  770 LB LIMIT [facility]   7/31/2024  9:00 AM [facility] XR1  LB LIMIT [facility]   8/2/2024  8:00 AM [facility]    8/2/2024 10:00 AM [facility]    8/2/2024  2:00 PM [facility]    8/12/2024 10:00 AM Catalino Kapoor DO [facility]   5/6/2025  8:40 AM Anshul Ly MD [place] [id number]        I spent a total of 40 minutes on the day of the visit.This includes face to face time and non-face to face time preparing to see the patient (eg, review of tests), obtaining and/or reviewing separately obtained history, documenting clinical information in the electronic or other health record, independently interpreting results and communicating results to the patient/family/caregiver, or care coordinator.      Signature:  Anshul Ly MD  [facility]  [address] [id number]    Date of encounter: 7/25/24

## 2024-07-31 ENCOUNTER — HOSPITAL ENCOUNTER (OUTPATIENT)
Dept: RADIOLOGY | Facility: HOSPITAL | Age: 64
Discharge: HOME | End: 2024-07-31
Attending: STUDENT IN AN ORGANIZED HEALTH CARE EDUCATION/TRAINING PROGRAM
Payer: OTHER GOVERNMENT

## 2024-07-31 DIAGNOSIS — M48.00 SPINAL STENOSIS, UNSPECIFIED SPINAL REGION: Chronic | ICD-10-CM

## 2024-07-31 PROCEDURE — 72082 X-RAY EXAM ENTIRE SPI 2/3 VW: CPT | Mod: TC

## 2024-07-31 PROCEDURE — 72114 X-RAY EXAM L-S SPINE BENDING: CPT | Mod: TC

## 2024-07-31 PROCEDURE — 72052 X-RAY EXAM NECK SPINE 6/>VWS: CPT | Mod: TC

## 2024-08-02 ENCOUNTER — HOSPITAL ENCOUNTER (OUTPATIENT)
Dept: RADIOLOGY | Facility: HOSPITAL | Age: 64
Discharge: HOME | End: 2024-08-02
Attending: NURSE PRACTITIONER
Payer: OTHER GOVERNMENT

## 2024-08-02 ENCOUNTER — ANESTHESIA (OUTPATIENT)
Dept: SURGERY | Facility: HOSPITAL | Age: 64
End: 2024-08-02
Payer: OTHER GOVERNMENT

## 2024-08-02 ENCOUNTER — HOSPITAL ENCOUNTER (OUTPATIENT)
Facility: HOSPITAL | Age: 64
Discharge: HOME | End: 2024-08-02
Attending: ANESTHESIOLOGY | Admitting: ANESTHESIOLOGY
Payer: OTHER GOVERNMENT

## 2024-08-02 VITALS
HEIGHT: 70 IN | RESPIRATION RATE: 16 BRPM | TEMPERATURE: 97 F | HEART RATE: 76 BPM | DIASTOLIC BLOOD PRESSURE: 75 MMHG | SYSTOLIC BLOOD PRESSURE: 117 MMHG | OXYGEN SATURATION: 95 % | WEIGHT: 189.63 LBS | BODY MASS INDEX: 27.15 KG/M2

## 2024-08-02 DIAGNOSIS — M54.6 PAIN IN THORACIC SPINE: ICD-10-CM

## 2024-08-02 DIAGNOSIS — M48.02 SPINAL STENOSIS IN CERVICAL REGION: ICD-10-CM

## 2024-08-02 DIAGNOSIS — M51.34 DEGENERATIVE DISC DISEASE, THORACIC: ICD-10-CM

## 2024-08-02 DIAGNOSIS — M48.07 SPINAL STENOSIS, LUMBOSACRAL REGION: ICD-10-CM

## 2024-08-02 DIAGNOSIS — M48.02 SPINAL STENOSIS, CERVICAL REGION: ICD-10-CM

## 2024-08-02 DIAGNOSIS — M48.07 LUMBOSACRAL STENOSIS: ICD-10-CM

## 2024-08-02 DIAGNOSIS — M51.34 DEGENERATION OF THORACIC INTERVERTEBRAL DISC: ICD-10-CM

## 2024-08-02 PROCEDURE — 63600175 PHARM REV CODE 636 W HCPCS

## 2024-08-02 PROCEDURE — 25000003 PHARM REV CODE 250

## 2024-08-02 PROCEDURE — 72146 MRI CHEST SPINE W/O DYE: CPT | Mod: TC

## 2024-08-02 PROCEDURE — 37000009 HC ANESTHESIA EA ADD 15 MINS: Performed by: ANESTHESIOLOGY

## 2024-08-02 PROCEDURE — 72148 MRI LUMBAR SPINE W/O DYE: CPT | Mod: TC

## 2024-08-02 PROCEDURE — 37000008 HC ANESTHESIA 1ST 15 MINUTES: Performed by: ANESTHESIOLOGY

## 2024-08-02 PROCEDURE — 72141 MRI NECK SPINE W/O DYE: CPT | Mod: TC

## 2024-08-02 RX ORDER — PROPOFOL 10 MG/ML
INJECTION, EMULSION INTRAVENOUS
Status: DISCONTINUED | OUTPATIENT
Start: 2024-08-02 | End: 2024-08-02

## 2024-08-02 RX ORDER — DEXAMETHASONE SODIUM PHOSPHATE 4 MG/ML
INJECTION, SOLUTION INTRA-ARTICULAR; INTRALESIONAL; INTRAMUSCULAR; INTRAVENOUS; SOFT TISSUE
Status: DISCONTINUED | OUTPATIENT
Start: 2024-08-02 | End: 2024-08-02

## 2024-08-02 RX ORDER — LIDOCAINE HYDROCHLORIDE 20 MG/ML
INJECTION INTRAVENOUS
Status: DISCONTINUED | OUTPATIENT
Start: 2024-08-02 | End: 2024-08-02

## 2024-08-02 RX ORDER — HYDROMORPHONE HYDROCHLORIDE 2 MG/ML
0.5 INJECTION, SOLUTION INTRAMUSCULAR; INTRAVENOUS; SUBCUTANEOUS EVERY 5 MIN PRN
Status: DISCONTINUED | OUTPATIENT
Start: 2024-08-02 | End: 2024-08-02

## 2024-08-02 RX ORDER — ONDANSETRON HYDROCHLORIDE 2 MG/ML
INJECTION, SOLUTION INTRAVENOUS
Status: DISCONTINUED | OUTPATIENT
Start: 2024-08-02 | End: 2024-08-02

## 2024-08-02 RX ORDER — PHENYLEPHRINE HCL IN 0.9% NACL 1 MG/10 ML
SYRINGE (ML) INTRAVENOUS
Status: DISCONTINUED | OUTPATIENT
Start: 2024-08-02 | End: 2024-08-02

## 2024-08-02 RX ORDER — DIPHENHYDRAMINE HYDROCHLORIDE 50 MG/ML
25 INJECTION INTRAMUSCULAR; INTRAVENOUS EVERY 6 HOURS PRN
Status: DISCONTINUED | OUTPATIENT
Start: 2024-08-02 | End: 2024-08-02

## 2024-08-02 RX ORDER — GLYCOPYRROLATE 0.2 MG/ML
INJECTION INTRAMUSCULAR; INTRAVENOUS
Status: DISCONTINUED | OUTPATIENT
Start: 2024-08-02 | End: 2024-08-02

## 2024-08-02 RX ORDER — EPHEDRINE SULFATE 50 MG/ML
INJECTION, SOLUTION INTRAVENOUS
Status: DISCONTINUED | OUTPATIENT
Start: 2024-08-02 | End: 2024-08-02

## 2024-08-02 RX ORDER — HYDROMORPHONE HYDROCHLORIDE 2 MG/ML
0.2 INJECTION, SOLUTION INTRAMUSCULAR; INTRAVENOUS; SUBCUTANEOUS EVERY 5 MIN PRN
Status: DISCONTINUED | OUTPATIENT
Start: 2024-08-02 | End: 2024-08-02

## 2024-08-02 RX ORDER — GLUCAGON 1 MG
1 KIT INJECTION
Status: DISCONTINUED | OUTPATIENT
Start: 2024-08-02 | End: 2024-08-02

## 2024-08-02 RX ORDER — MEPERIDINE HYDROCHLORIDE 25 MG/ML
12.5 INJECTION INTRAMUSCULAR; INTRAVENOUS; SUBCUTANEOUS ONCE
Status: DISCONTINUED | OUTPATIENT
Start: 2024-08-02 | End: 2024-08-02

## 2024-08-02 RX ORDER — ONDANSETRON HYDROCHLORIDE 2 MG/ML
4 INJECTION, SOLUTION INTRAVENOUS ONCE
Status: DISCONTINUED | OUTPATIENT
Start: 2024-08-02 | End: 2024-08-02

## 2024-08-02 RX ADMIN — SODIUM CHLORIDE, SODIUM GLUCONATE, SODIUM ACETATE, POTASSIUM CHLORIDE AND MAGNESIUM CHLORIDE: 526; 502; 368; 37; 30 INJECTION, SOLUTION INTRAVENOUS at 12:08

## 2024-08-02 RX ADMIN — Medication 100 MCG: at 01:08

## 2024-08-02 RX ADMIN — EPHEDRINE SULFATE 20 MG: 50 INJECTION INTRAVENOUS at 01:08

## 2024-08-02 RX ADMIN — ONDANSETRON 4 MG: 2 INJECTION INTRAMUSCULAR; INTRAVENOUS at 01:08

## 2024-08-02 RX ADMIN — EPHEDRINE SULFATE 10 MG: 50 INJECTION INTRAVENOUS at 02:08

## 2024-08-02 RX ADMIN — LIDOCAINE HYDROCHLORIDE 40 MG: 20 INJECTION INTRAVENOUS at 02:08

## 2024-08-02 RX ADMIN — GLYCOPYRROLATE 0.1 MG: 0.2 INJECTION INTRAMUSCULAR; INTRAVENOUS at 12:08

## 2024-08-02 RX ADMIN — LIDOCAINE HYDROCHLORIDE 80 MG: 20 INJECTION INTRAVENOUS at 12:08

## 2024-08-02 RX ADMIN — PROPOFOL 160 MG: 10 INJECTION, EMULSION INTRAVENOUS at 12:08

## 2024-08-02 RX ADMIN — DEXAMETHASONE SODIUM PHOSPHATE 4 MG: 4 INJECTION, SOLUTION INTRA-ARTICULAR; INTRALESIONAL; INTRAMUSCULAR; INTRAVENOUS; SOFT TISSUE at 01:08

## 2024-08-02 NOTE — ANESTHESIA PREPROCEDURE EVALUATION
08/02/2024  Jaxson Ferrell is a 64 y.o., male.  Chronic back pain  Unable to complete MRI- presenting for MRI with anesthesia.    EKG normal  No cardiac hx or sxs    Pre-op Assessment    I have reviewed the Patient Summary Reports.     I have reviewed the Nursing Notes. I have reviewed the NPO Status.   I have reviewed the Medications.     Review of Systems  Anesthesia Hx:  No problems with previous Anesthesia                Renal/:  Chronic Renal Disease        Kidney Function/Disease                 Physical Exam  General: Well nourished, Cooperative, Alert and Oriented    Airway:  Mallampati: II   Mouth Opening: Normal  TM Distance: Normal  Tongue: Normal  Neck ROM: Normal ROM    Dental:  Intact        Anesthesia Plan  Type of Anesthesia, risks & benefits discussed:    Anesthesia Type: Gen Supraglottic Airway  Intra-op Monitoring Plan: Standard ASA Monitors  Post Op Pain Control Plan: multimodal analgesia  Induction:  IV  Airway Plan: Direct, Post-Induction  Informed Consent: Informed consent signed with the Patient representative and all parties understand the risks and agree with anesthesia plan.  All questions answered. Patient consented to blood products? Yes  ASA Score: 2  Day of Surgery Review of History & Physical: H&P Update referred to the surgeon/provider.  Anesthesia Plan Notes: STATES NARCOTICS CAUSE SEVERE URINARY RETENTION.    Ready For Surgery From Anesthesia Perspective.     .

## 2024-08-02 NOTE — DISCHARGE INSTRUCTIONS
NAUSEA: Nausea may occur in the 24 hours following anesthesia. Patient may resume normal diet as tolerated. If nausea/vomiting occurs, we recommend starting with only clear liquids then progressing up to full liquids then bland soft foods, then full regular diet. In the case that patient is unable to hold anything down and/or nausea presents past 24 hours, report to nearest ER.    INFECTION:  Although no incisions are present, watch for any signs or symptoms of infection such as chills, fever, and redness or drainage at IV site. Notify your doctor if any of these abnormal findings occur.    PAIN : Discomfort from positioning and IV site may occur post procedure. Patient may take approved OTC medications as needed. Ice packs can be placed over site where IV was removed from.    BLEEDING: Bleeding is not an expected finding post procedure. If any bleeding occurs from IV site after IV is removed, hold pressure with a gauze or bandaid and replace dressing once bleeding has stopped.

## 2024-08-02 NOTE — ANESTHESIA POSTPROCEDURE EVALUATION
Anesthesia Post Evaluation    Patient: Jaxson Ferrell    Procedure(s) Performed: Procedure(s) (LRB):  MRI (Magnetic Resonance Imagine) (N/A)    Final Anesthesia Type: general      Patient location during evaluation: PACU  Patient participation: Yes- Able to Participate  Level of consciousness: awake and alert  Post-procedure vital signs: reviewed and stable  Pain management: adequate  Airway patency: patent      Anesthetic complications: no      Cardiovascular status: hemodynamically stable  Respiratory status: unassisted  Hydration status: euvolemic  Follow-up not needed.              Vitals Value Taken Time   /77 08/02/24 1501   Temp 36.2 °C (97.2 °F) 08/02/24 1449   Pulse 79 08/02/24 1503   Resp 20 08/02/24 1503   SpO2 97 % 08/02/24 1503   Vitals shown include unfiled device data.      No case tracking events are documented in the log.      Pain/Nidhi Score: Nidhi Score: 10 (8/2/2024  2:46 PM)

## 2024-08-02 NOTE — TRANSFER OF CARE
"Anesthesia Transfer of Care Note    Patient: Jaxson Ferrell    Procedure(s) Performed: Procedure(s) (LRB):  MRI (Magnetic Resonance Imagine) (N/A)    Patient location: PACU    Anesthesia Type: general    Transport from OR: Transported from OR on room air with adequate spontaneous ventilation    Post pain: adequate analgesia    Post assessment: no apparent anesthetic complications    Post vital signs: stable    Level of consciousness: awake, alert and oriented    Nausea/Vomiting: no nausea/vomiting    Complications: none    Transfer of care protocol was followed      Last vitals: Visit Vitals  /79   Pulse 89   Temp 36.2 °C (97.2 °F)   Resp (!) 22   Ht 5' 10" (1.778 m)   Wt 86 kg (189 lb 9.5 oz)   SpO2 99%   BMI 27.20 kg/m²     "

## 2024-08-02 NOTE — ANESTHESIA PROCEDURE NOTES
Intubation    Date/Time: 8/2/2024 1:00 PM    Performed by: Paul Modi CRNA  Authorized by: Evi Timmons MD    Intubation:     Induction:  Intravenous    Intubated:  Postinduction    Mask Ventilation:  Easy mask    Attempts:  1    Attempted By:  CRNA    Difficult Airway Encountered?: No      Complications:  None    Airway Device:  Supraglottic airway/LMA    Airway Device Size:  4.0    Style/Cuff Inflation:  Uncuffed    Secured at:  The lips    Placement Verified By:  Capnometry    Complicating Factors:  None    Findings Post-Intubation:  BS equal bilateral and atraumatic/condition of teeth unchanged

## 2024-08-05 PROBLEM — Z00.00 WELLNESS EXAMINATION: Status: RESOLVED | Noted: 2024-05-01 | Resolved: 2024-08-05

## 2024-08-08 ENCOUNTER — OFFICE VISIT (OUTPATIENT)
Dept: NEUROSURGERY | Facility: CLINIC | Age: 64
End: 2024-08-08
Payer: OTHER GOVERNMENT

## 2024-08-08 DIAGNOSIS — M54.50 DORSALGIA OF LUMBAR REGION: ICD-10-CM

## 2024-08-08 DIAGNOSIS — M54.2 CERVICALGIA: Primary | ICD-10-CM

## 2024-08-08 PROCEDURE — 99215 OFFICE O/P EST HI 40 MIN: CPT | Mod: 95,,, | Performed by: STUDENT IN AN ORGANIZED HEALTH CARE EDUCATION/TRAINING PROGRAM

## 2024-08-15 ENCOUNTER — HOSPITAL ENCOUNTER (EMERGENCY)
Facility: HOSPITAL | Age: 64
Discharge: HOME | End: 2024-08-15
Attending: EMERGENCY MEDICINE
Payer: OTHER GOVERNMENT

## 2024-08-15 VITALS
OXYGEN SATURATION: 95 % | RESPIRATION RATE: 18 BRPM | TEMPERATURE: 98 F | SYSTOLIC BLOOD PRESSURE: 157 MMHG | HEART RATE: 101 BPM | BODY MASS INDEX: 27.2 KG/M2 | DIASTOLIC BLOOD PRESSURE: 93 MMHG | HEIGHT: 70 IN | WEIGHT: 190 LBS

## 2024-08-15 DIAGNOSIS — N39.0 URINARY TRACT INFECTION ASSOCIATED WITH INDWELLING URETHRAL CATHETER, INITIAL ENCOUNTER: ICD-10-CM

## 2024-08-15 DIAGNOSIS — R33.9 URINARY RETENTION: Primary | ICD-10-CM

## 2024-08-15 DIAGNOSIS — T83.511A URINARY TRACT INFECTION ASSOCIATED WITH INDWELLING URETHRAL CATHETER, INITIAL ENCOUNTER: ICD-10-CM

## 2024-08-15 LAB
ALBUMIN SERPL-MCNC: 4.1 G/DL (ref 3.4–4.8)
ALBUMIN/GLOB SERPL: 1.4 RATIO (ref 1.1–2)
ALP SERPL-CCNC: 55 UNIT/L (ref 40–150)
ALT SERPL-CCNC: 25 UNIT/L (ref 0–55)
ANION GAP SERPL CALC-SCNC: 11 MEQ/L
AST SERPL-CCNC: 16 UNIT/L (ref 5–34)
BACTERIA #/AREA URNS AUTO: ABNORMAL /HPF
BASOPHILS # BLD AUTO: 0.02 X10(3)/MCL
BASOPHILS NFR BLD AUTO: 0.2 %
BILIRUB SERPL-MCNC: 0.7 MG/DL
BILIRUB UR QL STRIP.AUTO: NEGATIVE
BUN SERPL-MCNC: 14 MG/DL (ref 8.4–25.7)
CALCIUM SERPL-MCNC: 10.1 MG/DL (ref 8.8–10)
CHLORIDE SERPL-SCNC: 108 MMOL/L (ref 98–107)
CLARITY UR: ABNORMAL
CO2 SERPL-SCNC: 19 MMOL/L (ref 23–31)
COLOR UR AUTO: ABNORMAL
CREAT SERPL-MCNC: 1.1 MG/DL (ref 0.73–1.18)
CREAT/UREA NIT SERPL: 13
EOSINOPHIL # BLD AUTO: 0.05 X10(3)/MCL (ref 0–0.9)
EOSINOPHIL NFR BLD AUTO: 0.6 %
ERYTHROCYTE [DISTWIDTH] IN BLOOD BY AUTOMATED COUNT: 13.5 % (ref 11.5–17)
GFR SERPLBLD CREATININE-BSD FMLA CKD-EPI: >60 ML/MIN/1.73/M2
GLOBULIN SER-MCNC: 3 GM/DL (ref 2.4–3.5)
GLUCOSE SERPL-MCNC: 114 MG/DL (ref 82–115)
GLUCOSE UR QL STRIP: NORMAL
HCT VFR BLD AUTO: 43.2 % (ref 42–52)
HGB BLD-MCNC: 14.6 G/DL (ref 14–18)
HGB UR QL STRIP: ABNORMAL
IMM GRANULOCYTES # BLD AUTO: 0.02 X10(3)/MCL (ref 0–0.04)
IMM GRANULOCYTES NFR BLD AUTO: 0.2 %
KETONES UR QL STRIP: ABNORMAL
LEUKOCYTE ESTERASE UR QL STRIP: 75
LYMPHOCYTES # BLD AUTO: 1.11 X10(3)/MCL (ref 0.6–4.6)
LYMPHOCYTES NFR BLD AUTO: 12.6 %
MCH RBC QN AUTO: 30.1 PG (ref 27–31)
MCHC RBC AUTO-ENTMCNC: 33.8 G/DL (ref 33–36)
MCV RBC AUTO: 89.1 FL (ref 80–94)
MONOCYTES # BLD AUTO: 1.05 X10(3)/MCL (ref 0.1–1.3)
MONOCYTES NFR BLD AUTO: 11.9 %
MUCOUS THREADS URNS QL MICRO: ABNORMAL /LPF
NEUTROPHILS # BLD AUTO: 6.56 X10(3)/MCL (ref 2.1–9.2)
NEUTROPHILS NFR BLD AUTO: 74.5 %
NITRITE UR QL STRIP: ABNORMAL
NRBC BLD AUTO-RTO: 0 %
PH UR STRIP: 6.5 [PH]
PLATELET # BLD AUTO: 285 X10(3)/MCL (ref 130–400)
PLATELETS.RETICULATED NFR BLD AUTO: 1.7 % (ref 0.9–11.2)
PMV BLD AUTO: 9.4 FL (ref 7.4–10.4)
POTASSIUM SERPL-SCNC: 4.1 MMOL/L (ref 3.5–5.1)
PROT SERPL-MCNC: 7.1 GM/DL (ref 5.8–7.6)
PROT UR QL STRIP: ABNORMAL
RBC # BLD AUTO: 4.85 X10(6)/MCL (ref 4.7–6.1)
RBC #/AREA URNS AUTO: >100 /HPF
SODIUM SERPL-SCNC: 138 MMOL/L (ref 136–145)
SP GR UR STRIP.AUTO: 1.01 (ref 1–1.03)
SQUAMOUS #/AREA URNS LPF: ABNORMAL /HPF
UROBILINOGEN UR STRIP-ACNC: NORMAL
WBC # BLD AUTO: 8.81 X10(3)/MCL (ref 4.5–11.5)
WBC #/AREA URNS AUTO: ABNORMAL /HPF

## 2024-08-15 PROCEDURE — 81001 URINALYSIS AUTO W/SCOPE: CPT | Performed by: EMERGENCY MEDICINE

## 2024-08-15 PROCEDURE — 25000003 PHARM REV CODE 250: Performed by: STUDENT IN AN ORGANIZED HEALTH CARE EDUCATION/TRAINING PROGRAM

## 2024-08-15 PROCEDURE — 85025 COMPLETE CBC W/AUTO DIFF WBC: CPT | Performed by: EMERGENCY MEDICINE

## 2024-08-15 PROCEDURE — 96374 THER/PROPH/DIAG INJ IV PUSH: CPT

## 2024-08-15 PROCEDURE — 96361 HYDRATE IV INFUSION ADD-ON: CPT

## 2024-08-15 PROCEDURE — 99284 EMERGENCY DEPT VISIT MOD MDM: CPT | Mod: 25

## 2024-08-15 PROCEDURE — 87086 URINE CULTURE/COLONY COUNT: CPT | Performed by: EMERGENCY MEDICINE

## 2024-08-15 PROCEDURE — 80053 COMPREHEN METABOLIC PANEL: CPT | Performed by: EMERGENCY MEDICINE

## 2024-08-15 PROCEDURE — 63600175 PHARM REV CODE 636 W HCPCS: Performed by: EMERGENCY MEDICINE

## 2024-08-15 PROCEDURE — 51702 INSERT TEMP BLADDER CATH: CPT

## 2024-08-15 PROCEDURE — 25000003 PHARM REV CODE 250: Performed by: EMERGENCY MEDICINE

## 2024-08-15 RX ORDER — LIDOCAINE HYDROCHLORIDE 20 MG/ML
JELLY TOPICAL
Status: COMPLETED | OUTPATIENT
Start: 2024-08-15 | End: 2024-08-15

## 2024-08-15 RX ORDER — CIPROFLOXACIN 500 MG/1
500 TABLET ORAL 2 TIMES DAILY
Qty: 10 TABLET | Refills: 0 | Status: SHIPPED | OUTPATIENT
Start: 2024-08-15 | End: 2024-08-20

## 2024-08-15 RX ORDER — KETOROLAC TROMETHAMINE 10 MG/1
10 TABLET, FILM COATED ORAL EVERY 6 HOURS PRN
Qty: 20 TABLET | Refills: 0 | Status: SHIPPED | OUTPATIENT
Start: 2024-08-15 | End: 2024-08-20

## 2024-08-15 RX ORDER — KETOROLAC TROMETHAMINE 30 MG/ML
15 INJECTION, SOLUTION INTRAMUSCULAR; INTRAVENOUS
Status: COMPLETED | OUTPATIENT
Start: 2024-08-15 | End: 2024-08-15

## 2024-08-15 RX ADMIN — KETOROLAC TROMETHAMINE 15 MG: 30 INJECTION, SOLUTION INTRAMUSCULAR at 06:08

## 2024-08-15 RX ADMIN — SODIUM CHLORIDE 1000 ML: 9 INJECTION, SOLUTION INTRAVENOUS at 05:08

## 2024-08-15 RX ADMIN — LIDOCAINE HYDROCHLORIDE: 20 JELLY TOPICAL at 04:08

## 2024-08-15 NOTE — ED TRIAGE NOTES
Hx of kidney stones, renal stent placed on Wednesday, f/u w/ urologist today and catheter removed. unable to urinate since leaving urologist yesterday. Pt c/o severe L flank pain at this time as well

## 2024-08-15 NOTE — ED PROVIDER NOTES
Encounter Date: 8/15/2024       History     Chief Complaint   Patient presents with    Urinary Retention     Hx of kidney stones, renal stent placed on Wednesday, f/u w/ urologist today and catheter removed. unable to urinate since leaving urologist yesterday. Pt c/o severe L flank pain at this time as well     65 yo M with h/o ureteral calculus and intermittent urinary retention presents to the ed with difficulty urinating, suprapubic fullness since yesterday evening. Had lithotripsy and ureteral stent a few days ago with Dr. Mata. Went to urology clinic yesterday at 1, did voiding trial and was able to have catheter dc and discharge by 4. Able to urinate at home but slowly found that he had more and more difficulty urinating, urinating onl small amounts with pain with each void. He also endorses increased left flank pain    The history is provided by the patient. No  was used.     Review of patient's allergies indicates:  No Known Allergies  Past Medical History:   Diagnosis Date    Chronic pain     Kidney stones     Osteoporosis     Spinal stenosis     thoracic     Past Surgical History:   Procedure Laterality Date    APPENDECTOMY      COLONOSCOPY N/A 12/06/2022    Varghese Padilla    CYSTOSCOPY W/ LASER LITHOTRIPSY      EPIDURAL STEROID INJECTION INTO THORACIC SPINE N/A 03/20/2023    Procedure: INJECTION, STEROID, SPINE, THORACIC, EPIDURAL T10-11 interlaminar;  Surgeon: Autumn Buckley MD;  Location: [facility] OR;  Service: Pain Management;  Laterality: N/A;  T10-11 interlaminar    INJECTION OF ANESTHETIC AGENT AROUND MEDIAL BRANCH NERVES INNERVATING CERVICAL FACET JOINT Right 11/16/2023    Procedure: BLOCK, NERVE, FACET JOINT, CERVICAL, MEDIAL BRANCH Right MBB T10-12;  Surgeon: Autumn Buckley MD;  Location: [facility] OR;  Service: Pain Management;  Laterality: Right;    INJECTION OF ANESTHETIC AGENT AROUND MEDIAL BRANCH NERVES INNERVATING CERVICAL FACET JOINT Right 03/18/2024    Procedure: BLOCK,  NERVE, FACET JOINT, CERVICAL, MEDIAL BRANCH Right  T10-12 no sedation;  Surgeon: Autumn Buckley MD;  Location: [facility] OR;  Service: Pain Management;  Laterality: Right;  Right MBB T10-12 no sedation    INJECTION OF ANESTHETIC AGENT AROUND MEDIAL BRANCH NERVES INNERVATING LUMBAR FACET JOINT Right 2024    Procedure: BLOCK, NERVE, FACET JOINT, LUMBAR, MEDIAL BRANCH Right L1-2   Patient does not want sedation;  Surgeon: Autumn Buckley MD;  Location: [facility] OR;  Service: Pain Management;  Laterality: Right;  Right MBB L1-2    INJECTION OF ANESTHETIC AGENT AROUND MEDIAL BRANCH NERVES INNERVATING LUMBAR FACET JOINT Right 2024    Procedure: BLOCK, NERVE, FACET JOINT, LUMBAR, MEDIAL BRANCH Right L1 L3  no ansesthesia;  Surgeon: Autumn Buckley MD;  Location: [facility] OR;  Service: Pain Management;  Laterality: Right;  MBB Rt L1-L3 *No Anesthesia*    MAGNETIC RESONANCE IMAGING N/A 2024    Procedure: MRI (Magnetic Resonance Imagine);  Surgeon: Pricila Meeks MD;  Location: [facility] OR;  Service: Anesthesiology;  Laterality: N/A;  MRI LUMBAR, THORACIC, CERVICAL SPINE W/OUT CONTRAST WITH ANESTHESIA    RADIOFREQUENCY ABLATION Right 2024    Procedure: RADIOFREQUENCY ABLATION Right L1-2;  Surgeon: Autumn Buckley MD;  Location: [facility] OR;  Service: Pain Management;  Laterality: Right;    spinal injections      SPINE SURGERY      fusion 2 levels with rods and screws, anterior approach    TONSILLECTOMY      turbinate reduction       Family History   Problem Relation Name Age of Onset    Breast cancer Mother      Coronary artery disease Father      Colon cancer Father      Prostate cancer Father       Social History     Tobacco Use    Smoking status: Former     Current packs/day: 0.00     Types: Cigarettes     Quit date: 3/2/2010     Years since quittin.4    Smokeless tobacco: Never   Substance Use Topics    Alcohol use: Yes     Alcohol/week: 3.0 standard drinks of alcohol     Types: 3 Glasses of wine per  week     Comment: WINE 2 to 3 times week    Drug use: Not Currently     Review of Systems    Physical Exam     Initial Vitals [08/15/24 0415]   BP Pulse Resp Temp SpO2   (!) 157/93 101 18 98.2 °F (36.8 °C) 95 %      MAP       --         Physical Exam    Nursing note and vitals reviewed.  Constitutional: He appears well-developed and well-nourished. He is not diaphoretic. No distress.   HENT:   Head: Normocephalic and atraumatic.   Nose: Nose normal.   Mouth/Throat: Oropharynx is clear and moist.   Eyes: Conjunctivae and EOM are normal. Pupils are equal, round, and reactive to light.   Neck: Trachea normal. Neck supple.   Normal range of motion.  Cardiovascular:  Normal rate, regular rhythm, normal heart sounds and intact distal pulses.     Exam reveals no gallop and no friction rub.       No murmur heard.  Pulmonary/Chest: Breath sounds normal. No respiratory distress. He has no wheezes. He has no rhonchi. He has no rales. He exhibits no tenderness.   Abdominal: Abdomen is soft. Bowel sounds are normal. He exhibits no distension and no mass. There is abdominal tenderness.   Suprapubic fullness  Bladder scan post void residual 395 There is no rebound.   Musculoskeletal:         General: No tenderness or edema. Normal range of motion.      Cervical back: Normal range of motion and neck supple.      Lumbar back: Normal. No tenderness. Normal range of motion.     Neurological: He is alert and oriented to person, place, and time. He has normal strength. No cranial nerve deficit or sensory deficit.   Skin: Skin is warm and dry. Capillary refill takes less than 2 seconds. No rash and no abscess noted. No erythema. No pallor.   Psychiatric: He has a normal mood and affect. His behavior is normal. Judgment and thought content normal.         ED Course   Procedures  Labs Reviewed   COMPREHENSIVE METABOLIC PANEL - Abnormal       Result Value    Sodium 138      Potassium 4.1      Chloride 108 (*)     CO2 19 (*)     Glucose 114       Blood Urea Nitrogen 14.0      Creatinine 1.10      Calcium 10.1 (*)     Protein Total 7.1      Albumin 4.1      Globulin 3.0      Albumin/Globulin Ratio 1.4      Bilirubin Total 0.7      ALP 55      ALT 25      AST 16      eGFR >60      Anion Gap 11.0      BUN/Creatinine Ratio 13     URINALYSIS, REFLEX TO URINE CULTURE - Abnormal    Color, UA Dark-Yellow      Appearance, UA Turbid (*)     Specific Gravity, UA 1.013      pH, UA 6.5      Protein, UA 2+ (*)     Glucose, UA Normal      Ketones, UA 1+ (*)     Blood, UA 3+ (*)     Bilirubin, UA Negative      Urobilinogen, UA Normal      Nitrites, UA 1+ (*)     Leukocyte Esterase, UA 75 (*)     RBC, UA >100 (*)     WBC, UA 11-20 (*)     Bacteria, UA None Seen      Squamous Epithelial Cells, UA None Seen      Mucous, UA Trace (*)    CULTURE, URINE   CBC W/ AUTO DIFFERENTIAL    Narrative:     The following orders were created for panel order CBC auto differential.  Procedure                               Abnormality         Status                     ---------                               -----------         ------                     CBC with Differential[[id number]]                           Final result                 Please view results for these tests on the individual orders.   CBC WITH DIFFERENTIAL    WBC 8.81      RBC 4.85      Hgb 14.6      Hct 43.2      MCV 89.1      MCH 30.1      MCHC 33.8      RDW 13.5      Platelet 285      MPV 9.4      Neut % 74.5      Lymph % 12.6      Mono % 11.9      Eos % 0.6      Basophil % 0.2      Lymph # 1.11      Neut # 6.56      Mono # 1.05      Eos # 0.05      Baso # 0.02      IG# 0.02      IG% 0.2      NRBC% 0.0      IPF 1.7            Imaging Results              X-Ray Abdomen AP 1 View (In process)                   X-Rays:   Independently Interpreted Readings:   Other Readings:  Kub with double j stent in place    Medications   ketorolac injection 15 mg (has no administration in time range)   LIDOcaine HCl 2% urojet (  Mucous Membrane Given 8/15/24 0430)   sodium chloride 0.9% bolus 1,000 mL 1,000 mL (0 mLs Intravenous Stopped 8/15/24 0601)     Medical Decision Making  Given patient's presentation, differential diagnosis includes but is not limited to urinary retention, uti, renal fialure  To evaluate these  possible etiologies cbc, cmp, ua were ordered and reviewed  Kenney catheter placed  Labs unreamrkable, ?uti  Is oncipro once daily, will increase to bid  Discussed with urology, agrees with abx and close f/u, pt comfortable withplan    Problems Addressed:  Urinary retention: acute illness or injury that poses a threat to life or bodily functions  Urinary tract infection associated with indwelling urethral catheter, initial encounter: acute illness or injury that poses a threat to life or bodily functions    Amount and/or Complexity of Data Reviewed  Independent Historian: parent  External Data Reviewed: notes.     [facility]  Labs: ordered.  Radiology: ordered and independent interpretation performed.    Risk  OTC drugs.  Prescription drug management.               ED Course as of 08/15/24 0609   Thu Aug 15, 2024   0511 Pain drastically improved with placement of kenney [BS]   0549 Discussed with urology who agrees with plan [BS]      ED Course User Index  [BS] Ann Diaz MD                           Clinical Impression:  Final diagnoses:  [R33.9] Urinary retention (Primary)  [T83.511A, N39.0] Urinary tract infection associated with indwelling urethral catheter, initial encounter                 Ann Diaz MD  08/15/24 0609

## 2024-08-15 NOTE — DISCHARGE INSTRUCTIONS
Do not take the relafen while taking the toradol. Call the urologist this morning to follow up. Take the ciprofloxacin twice daily

## 2024-08-17 LAB — BACTERIA UR CULT: NO GROWTH

## 2024-08-27 ENCOUNTER — TELEPHONE (OUTPATIENT)
Dept: NEUROLOGY | Facility: CLINIC | Age: 64
End: 2024-08-27
Payer: OTHER GOVERNMENT

## 2024-08-27 NOTE — TELEPHONE ENCOUNTER
----- Message from Alicia Keita MA sent at 8/27/2024 10:15 AM CDT -----  Regarding: EMG  Good morning,     I have a patient who's in need of a EMG, would you be willing to assist me with getting him scheduled?    Thank you   Alicia CRAVEN  Neurosurgery Department  Ext: [phone]

## 2024-09-20 ENCOUNTER — TELEPHONE (OUTPATIENT)
Dept: PAIN MEDICINE | Facility: CLINIC | Age: 64
End: 2024-09-20
Payer: OTHER GOVERNMENT

## 2024-10-01 ENCOUNTER — TELEPHONE (OUTPATIENT)
Dept: NEUROLOGY | Facility: CLINIC | Age: 64
End: 2024-10-01
Payer: OTHER GOVERNMENT

## 2024-10-01 NOTE — TELEPHONE ENCOUNTER
----- Message from Nurse Blackwell sent at 8/29/2024  8:01 AM CDT -----  Regarding: FW: EMG    ----- Message -----  From: Sharron Anderson MA  Sent: 8/27/2024   2:19 PM CDT  To: Rosalva High LPN  Subject: RE: EMG                                          Unable to reach patient. I was able to schedule patient and leave voicemail with appt time and date. If you speak with this patient, please let ask them to contact our office for the appt time and date. I will attempt to reach patient again in coming days.  ----- Message -----  From: Rosalva High LPN  Sent: 8/27/2024  10:44 AM CDT  To: Lashell Meeks; Sharron Anderson MA  Subject: FW: EMG                                            ----- Message -----  From: Alicia Keita MA  Sent: 8/27/2024  10:17 AM CDT  To: Oren VILLAGOMEZ Staff  Subject: EMG                                              Good morning,     I have a patient who's in need of a EMG, would you be willing to assist me with getting him scheduled?    Thank you   Alicia CRAVEN  Neurosurgery Department  Ext: [phone]

## 2024-10-18 ENCOUNTER — OFFICE VISIT (OUTPATIENT)
Facility: CLINIC | Age: 64
End: 2024-10-18
Payer: OTHER GOVERNMENT

## 2024-10-18 VITALS
BODY MASS INDEX: 27.2 KG/M2 | DIASTOLIC BLOOD PRESSURE: 82 MMHG | WEIGHT: 190 LBS | HEIGHT: 70 IN | HEART RATE: 85 BPM | SYSTOLIC BLOOD PRESSURE: 135 MMHG

## 2024-10-18 DIAGNOSIS — M54.12 CERVICAL RADICULITIS: ICD-10-CM

## 2024-10-18 DIAGNOSIS — M50.30 DDD (DEGENERATIVE DISC DISEASE), CERVICAL: ICD-10-CM

## 2024-10-18 DIAGNOSIS — G89.29 CHRONIC NECK PAIN: Primary | ICD-10-CM

## 2024-10-18 DIAGNOSIS — M54.2 CHRONIC NECK PAIN: Primary | ICD-10-CM

## 2024-10-18 RX ORDER — HYOSCYAMINE SULFATE 0.12 MG/1
0.12 TABLET SUBLINGUAL EVERY 6 HOURS PRN
COMMUNITY
Start: 2024-08-12

## 2024-10-18 RX ORDER — PHENAZOPYRIDINE HYDROCHLORIDE 200 MG/1
200 TABLET, FILM COATED ORAL EVERY 8 HOURS PRN
COMMUNITY
Start: 2024-08-12

## 2024-10-18 RX ORDER — LORATADINE AND PSEUDOEPHEDRINE SULFATE 5; 120 MG/1; MG/1
TABLET, EXTENDED RELEASE ORAL
COMMUNITY

## 2024-10-18 RX ORDER — NABUMETONE 750 MG/1
TABLET, FILM COATED ORAL
COMMUNITY

## 2024-10-18 NOTE — PROGRESS NOTES
Autumn Buckley MD        PATIENT NAME: Jaxson Ferrell  : 1960  DATE: 10/18/24  MRN: [id number]      Billing Provider: Autumn Buckley MD  Level of Service:   Patient PCP Information       Provider PCP Type    Anshul East MD General            Reason for Visit / Chief Complaint: Back Pain (Pt having back & neck pain would like to address neck pain today C/O pain level 8, Taking Gabapentin & baclofen for pain, )       Update PCP  Update Chief Complaint         History of Present Illness / Problem Focused Workflow     Jaxson Ferrell presents to the clinic with Back Pain (Pt having back & neck pain would like to address neck pain today C/O pain level 8, Taking Gabapentin & baclofen for pain, )     This is a 64-year-old male who presents to clinic today complaining of neck pain.  He has had neck pain for many years.  The pain seems to be getting worse over the past couple of months, and particularly the past week.  He underwent some injections in his neck many years ago that had helped.  He would like to have another 1 done.      Back Pain  Associated symptoms include leg pain and numbness.   Low-back Pain  Associated symptoms include leg pain and numbness.   Mid-back Pain  Associated symptoms include leg pain and numbness.   Follow-up  Associated symptoms include numbness.       Review of Systems     Review of Systems   Musculoskeletal:  Positive for back pain and leg pain.   Neurological:  Positive for numbness.   All other systems reviewed and are negative.       Medical / Social / Family History     Past Medical History:   Diagnosis Date    Chronic pain     Kidney stones     Osteoporosis     Spinal stenosis     thoracic       Past Surgical History:   Procedure Laterality Date    APPENDECTOMY      COLONOSCOPY N/A 2022    Varghese Padilla    CYSTOSCOPY W/ LASER LITHOTRIPSY      EPIDURAL STEROID INJECTION INTO THORACIC SPINE N/A 2023    Procedure: INJECTION, STEROID, SPINE,  THORACIC, EPIDURAL T10-11 interlaminar;  Surgeon: Autumn Buckley MD;  Location: [facility] OR;  Service: Pain Management;  Laterality: N/A;  T10-11 interlaminar    INJECTION OF ANESTHETIC AGENT AROUND MEDIAL BRANCH NERVES INNERVATING CERVICAL FACET JOINT Right 11/16/2023    Procedure: BLOCK, NERVE, FACET JOINT, CERVICAL, MEDIAL BRANCH Right MBB T10-12;  Surgeon: Autumn Buckley MD;  Location: [facility] OR;  Service: Pain Management;  Laterality: Right;    INJECTION OF ANESTHETIC AGENT AROUND MEDIAL BRANCH NERVES INNERVATING CERVICAL FACET JOINT Right 03/18/2024    Procedure: BLOCK, NERVE, FACET JOINT, CERVICAL, MEDIAL BRANCH Right  T10-12 no sedation;  Surgeon: Autumn Buckley MD;  Location: [facility] OR;  Service: Pain Management;  Laterality: Right;  Right MBB T10-12 no sedation    INJECTION OF ANESTHETIC AGENT AROUND MEDIAL BRANCH NERVES INNERVATING LUMBAR FACET JOINT Right 02/01/2024    Procedure: BLOCK, NERVE, FACET JOINT, LUMBAR, MEDIAL BRANCH Right L1-2   Patient does not want sedation;  Surgeon: Autumn Buckley MD;  Location: [facility] OR;  Service: Pain Management;  Laterality: Right;  Right MBB L1-2    INJECTION OF ANESTHETIC AGENT AROUND MEDIAL BRANCH NERVES INNERVATING LUMBAR FACET JOINT Right 04/11/2024    Procedure: BLOCK, NERVE, FACET JOINT, LUMBAR, MEDIAL BRANCH Right L1 L3  no ansesthesia;  Surgeon: Autumn Buckley MD;  Location: [facility] OR;  Service: Pain Management;  Laterality: Right;  MBB Rt L1-L3 *No Anesthesia*    MAGNETIC RESONANCE IMAGING N/A 8/2/2024    Procedure: MRI (Magnetic Resonance Imagine);  Surgeon: Pricila Meeks MD;  Location: [facility] OR;  Service: Anesthesiology;  Laterality: N/A;  MRI LUMBAR, THORACIC, CERVICAL SPINE W/OUT CONTRAST WITH ANESTHESIA    RADIOFREQUENCY ABLATION Right 05/06/2024    Procedure: RADIOFREQUENCY ABLATION Right L1-2;  Surgeon: Autumn Buckley MD;  Location: [facility] OR;  Service: Pain Management;  Laterality: Right;    spinal injections      SPINE SURGERY      fusion 2  levels with rods and screws, anterior approach    TONSILLECTOMY      turbinate reduction         Social History  Mr. Ferrell  reports that he quit smoking about 14 years ago. His smoking use included cigarettes. He has never used smokeless tobacco. He reports current alcohol use of about 3.0 standard drinks of alcohol per week. He reports that he does not currently use drugs.    Family History  Mr.'s Ferrell family history includes Breast cancer in his mother; Colon cancer in his father; Coronary artery disease in his father; Prostate cancer in his father.    Medications and Allergies     Medications  Outpatient Medications Marked as Taking for the 10/18/24 encounter (Office Visit) with Autumn Buckley MD   Medication Sig Dispense Refill    acetaminophen (TYLENOL) 650 MG TbSR Take 650 mg by mouth 2 (two) times daily.      atorvastatin (LIPITOR) 40 MG tablet Take 40 mg by mouth once daily.      baclofen (LIORESAL) 10 MG tablet Take 10 mg by mouth 2 (two) times daily.      co-enzyme Q-10 30 mg capsule Take 30 mg by mouth once daily.      gabapentin (NEURONTIN) 800 MG tablet Take 800 mg by mouth 3 (three) times daily.      hyoscyamine 0.125 mg Subl Place 0.125 mg under the tongue every 6 (six) hours as needed.      loratadine-pseudoephedrine 5-120 mg (CLARITIN-D 12 HOUR) 5-120 mg per tablet 1 tablet as needed Orally once a day      nabumetone (RELAFEN) 750 MG tablet 1 tablet Orally Twice a day      phenazopyridine (PYRIDIUM) 200 MG tablet Take 200 mg by mouth every 8 (eight) hours as needed.      PROLIA 60 mg/mL Syrg Inject 60 mg into the skin every 6 (six) months.      vitamin D (VITAMIN D3) 1000 units Tab Take 5,000 Units by mouth 3 (three) times a week.         Allergies  Review of patient's allergies indicates:  No Known Allergies    Physical Examination     Vitals:    10/18/24 1105   BP: 135/82   Pulse: 85     Spine Musculoskeletal Exam    Gait    Gait is normal.    Inspection    Cervical Spine    Cervical  spine inspection is normal.    Thoracolumbar        Prior incision: lateral lumbar    Incision: clean, dry, intact and well-healed    Incisional drainage: none    Palpation    Cervical Spine    Tenderness: present      Paraspinous: right and left      Trapezius: right and left      Periscapular: right and left    Right      Masses: none      Spasms: none      Crepitus: none      Muscle tone: normal    Left      Masses: none      Spasms: none      Crepitus: none      Muscle tone: normal    Thoracolumbar    Tenderness: present      Paraspinous: right    Right      Masses: none      Spasms: none      Crepitus: none      Muscle tone: increased    Left      Masses: none      Spasms: none      Crepitus: none      Muscle tone: normal    Range of Motion    Cervical Spine        Cervical spine range of motion additional comments: Restricted range of motion in the cervical spine due to pain.    Thoracolumbar        Thoracolumbar range of motion additional comments: Restricted range of motion in the lumbar spine secondary to pain.  Positive facet loading.    Strength    Cervical Spine    Cervical spine motor exam is normal.    Thoracolumbar    Thoracolumbar motor exam is normal.       Sensory    Cervical Spine    Cervical spine sensation is normal.    Thoracolumbar    Thoracolumbar sensation is normal.    General      Constitutional: appears stated age, well-developed and well-nourished    Scleral icterus: no    Labored breathing: no    Psychiatric: normal mood and affect and no acute distress    Neurological: alert and oriented x3    Skin: intact    Lymphadenopathy: none  CV: extremities warm, well-perfused  Resp: nonlabored breathing      Assessment and Plan (including Health Maintenance)      Problem List  Smart Sets  Document Outside HM   :    Plan:   Chronic neck pain    Cervical radiculitis    DDD (degenerative disc disease), cervical       He is being scheduled for a cervical epidural steroid injection today to help  with his chronic neck pain radiating into the bilateral shoulders and upper extremities.  The plan was discussed with the patient and he wishes to proceed.    Problem List Items Addressed This Visit          Neuro    Cervical radiculitis    DDD (degenerative disc disease), cervical       Orthopedic    Chronic neck pain - Primary         Future Appointments   Date Time Provider Department Center   10/25/2024  9:30 AM Lasha Maldonado MD [facility]   11/11/2024 10:00 AM Catalino Kapoor DO [facility] NEUSGY [facility]   12/3/2024  8:15 [facility]   5/6/2025  8:40 AM Anshul Ly MD [place] [id number]        There are no Patient Instructions on file for this visit.  No follow-ups on file.     Signature:  Autumn Buckley MD      Date of encounter: 10/18/24

## 2024-10-23 ENCOUNTER — PATIENT MESSAGE (OUTPATIENT)
Dept: RESEARCH | Facility: HOSPITAL | Age: 64
End: 2024-10-23
Payer: OTHER GOVERNMENT

## 2024-10-24 ENCOUNTER — PATIENT MESSAGE (OUTPATIENT)
Dept: RESEARCH | Facility: HOSPITAL | Age: 64
End: 2024-10-24
Payer: OTHER GOVERNMENT

## 2024-11-05 ENCOUNTER — PROCEDURE VISIT (OUTPATIENT)
Dept: NEUROLOGY | Facility: CLINIC | Age: 64
End: 2024-11-05
Payer: OTHER GOVERNMENT

## 2024-11-05 DIAGNOSIS — M54.30 SCIATICA, UNSPECIFIED LATERALITY: Primary | ICD-10-CM

## 2024-11-05 DIAGNOSIS — M54.50 DORSALGIA OF LUMBAR REGION: ICD-10-CM

## 2024-11-05 PROCEDURE — 95886 MUSC TEST DONE W/N TEST COMP: CPT | Mod: S$GLB,,, | Performed by: SPECIALIST

## 2024-11-05 PROCEDURE — 95909 NRV CNDJ TST 5-6 STUDIES: CPT | Mod: S$GLB,,, | Performed by: SPECIALIST

## 2024-11-05 PROCEDURE — 95885 MUSC TST DONE W/NERV TST LIM: CPT | Mod: S$GLB,,, | Performed by: SPECIALIST

## 2024-11-05 NOTE — PROCEDURES
Nerve conductions / EMG performed and full report scanned in chart. (Media tab)   summary comments, if any:   ..sural absent on R  Otherwise NCV's ok   Needle EMG BLE's and paraspinals ok         Lasha Maldonado MD

## 2024-11-11 ENCOUNTER — OFFICE VISIT (OUTPATIENT)
Dept: NEUROSURGERY | Facility: CLINIC | Age: 64
End: 2024-11-11
Payer: OTHER GOVERNMENT

## 2024-11-11 VITALS
WEIGHT: 190.06 LBS | BODY MASS INDEX: 27.21 KG/M2 | HEIGHT: 70 IN | HEART RATE: 80 BPM | SYSTOLIC BLOOD PRESSURE: 133 MMHG | DIASTOLIC BLOOD PRESSURE: 84 MMHG | RESPIRATION RATE: 20 BRPM

## 2024-11-11 DIAGNOSIS — M54.50 DORSALGIA OF LUMBAR REGION: Primary | ICD-10-CM

## 2024-11-11 PROCEDURE — 99214 OFFICE O/P EST MOD 30 MIN: CPT | Mod: ,,, | Performed by: STUDENT IN AN ORGANIZED HEALTH CARE EDUCATION/TRAINING PROGRAM

## 2024-11-11 RX ORDER — LORATADINE AND PSEUDOEPHEDRINE SULFATE 5; 120 MG/1; MG/1
1 TABLET, EXTENDED RELEASE ORAL DAILY PRN
COMMUNITY

## 2024-11-11 RX ORDER — LORAZEPAM 1 MG/1
TABLET ORAL
COMMUNITY

## 2024-11-11 NOTE — PROGRESS NOTES
Neurosurgery  Established Patient    SUBJECTIVE:     History of Present Illness:  64 M who is s/p remote L4-S1 fusion presents in fu for worsening right sided low back pain. He has been going to pain mgmt for injections to treat this with limited relief of his pain. He describes right sided cramping low back pain which will wake him from sleep. Activity can help alleviate the pain however stretching makes it worse. He also endorses BLE radicular leg pain into his calves and down into his left foot. This leg pain is made worse with standing and relieved with leaning forward. He also feels that his legs are constantly vibrating. He notes some RUE radicular pain into his dorsal forearm and mild progressive hand weakness.      Interval fu 8/8/24: Pt presents in fu.  His main complaint is right sided low back pain and feels that the muscles in the right side of his back lock up.  He continues to also have right sided neck pain.  He denies any radicular arm pain at this time.  He also notes that when he turns his head to the right this will worsen his neck pain.    Interval fu 11/11/24: Pt presents in fu after EMG study.  His main complaint is right sided mid back pain and feeling that his back will lock up.    Review of patient's allergies indicates:  No Known Allergies    Current Outpatient Medications   Medication Sig Dispense Refill    acetaminophen (TYLENOL ARTHRITIS PAIN ORAL) as needed.      acetaminophen (TYLENOL) 650 MG TbSR Take 650 mg by mouth 2 (two) times daily.      atorvastatin (LIPITOR) 40 MG tablet Take 40 mg by mouth once daily.      baclofen (LIORESAL) 10 MG tablet Take 10 mg by mouth 2 (two) times daily.      co-enzyme Q-10 30 mg capsule Take 30 mg by mouth once daily.      fexofenadine HCl (MUCINEX ALLERGY ORAL) Take by mouth daily as needed.      gabapentin (NEURONTIN) 800 MG tablet Take 800 mg by mouth 3 (three) times daily.      loratadine-pseudoephedrine 5-120 mg (CLARITIN-D 12 HOUR) 5-120 mg per  tablet 1 tablet as needed Orally once a day      LORazepam (ATIVAN) 1 MG tablet as needed.      nabumetone (RELAFEN) 750 MG tablet 1 tablet Orally Twice a day      PROLIA 60 mg/mL Syrg Inject 60 mg into the skin every 6 (six) months.      vitamin D (VITAMIN D3) 1000 units Tab Take 5,000 Units by mouth 3 (three) times a week.      hyoscyamine 0.125 mg Subl Place 0.125 mg under the tongue every 6 (six) hours as needed.      loratadine-pseudoephedrine 5-120 mg (CLARITIN-D 12 HOUR) 5-120 mg per tablet Take 1 tablet by mouth daily as needed.      phenazopyridine (PYRIDIUM) 200 MG tablet Take 200 mg by mouth every 8 (eight) hours as needed.       No current facility-administered medications for this visit.       Past Medical History:   Diagnosis Date    Chronic pain     Enlarged prostate     Kidney stone     Kidney stones     Osteoporosis     Spinal stenosis     thoracic     Past Surgical History:   Procedure Laterality Date    Aquablation      COLONOSCOPY N/A 12/06/2022    [facility]    CYSTOSCOPY W/ LASER LITHOTRIPSY      EPIDURAL STEROID INJECTION INTO THORACIC SPINE N/A 03/20/2023    Procedure: INJECTION, STEROID, SPINE, THORACIC, EPIDURAL T10-11 interlaminar;  Surgeon: Autumn Buckley MD;  Location: [facility] OR;  Service: Pain Management;  Laterality: N/A;  T10-11 interlaminar    INJECTION OF ANESTHETIC AGENT AROUND MEDIAL BRANCH NERVES INNERVATING CERVICAL FACET JOINT Right 11/16/2023    Procedure: BLOCK, NERVE, FACET JOINT, CERVICAL, MEDIAL BRANCH Right MBB T10-12;  Surgeon: Autumn Buckley MD;  Location: [facility] OR;  Service: Pain Management;  Laterality: Right;    INJECTION OF ANESTHETIC AGENT AROUND MEDIAL BRANCH NERVES INNERVATING CERVICAL FACET JOINT Right 03/18/2024    Procedure: BLOCK, NERVE, FACET JOINT, CERVICAL, MEDIAL BRANCH Right  T10-12 no sedation;  Surgeon: Autumn Buckley MD;  Location: [facility] OR;  Service: Pain Management;  Laterality: Right;  Right MBB T10-12 no sedation    INJECTION OF  ANESTHETIC AGENT AROUND MEDIAL BRANCH NERVES INNERVATING LUMBAR FACET JOINT Right 2024    Procedure: BLOCK, NERVE, FACET JOINT, LUMBAR, MEDIAL BRANCH Right L1-2   Patient does not want sedation;  Surgeon: Autumn Buckley MD;  Location: [facility] OR;  Service: Pain Management;  Laterality: Right;  Right MBB L1-2    INJECTION OF ANESTHETIC AGENT AROUND MEDIAL BRANCH NERVES INNERVATING LUMBAR FACET JOINT Right 2024    Procedure: BLOCK, NERVE, FACET JOINT, LUMBAR, MEDIAL BRANCH Right L1 L3  no ansesthesia;  Surgeon: Autumn Buckley MD;  Location: [facility] OR;  Service: Pain Management;  Laterality: Right;  MBB Rt L1-L3 *No Anesthesia*    KIDNEY STONE SURGERY      MAGNETIC RESONANCE IMAGING N/A 2024    Procedure: MRI (Magnetic Resonance Imagine);  Surgeon: Pricila Meeks MD;  Location: [facility] OR;  Service: Anesthesiology;  Laterality: N/A;  MRI LUMBAR, THORACIC, CERVICAL SPINE W/OUT CONTRAST WITH ANESTHESIA    RADIOFREQUENCY ABLATION Right 2024    Procedure: RADIOFREQUENCY ABLATION Right L1-2;  Surgeon: Autumn Buckley MD;  Location: [facility] OR;  Service: Pain Management;  Laterality: Right;    spinal injections      SPINE SURGERY      fusion 2 levels with rods and screws, anterior approach    TONSILLECTOMY      turbinate reduction       Family History       Problem Relation (Age of Onset)    Breast cancer Mother    Colon cancer Father    Coronary artery disease Father    Prostate cancer Father          Social History     Socioeconomic History    Marital status: Single   Tobacco Use    Smoking status: Former     Current packs/day: 0.00     Types: Cigarettes     Quit date: 3/2/2010     Years since quittin.7    Smokeless tobacco: Never   Substance and Sexual Activity    Alcohol use: Yes     Alcohol/week: 3.0 standard drinks of alcohol     Types: 3 Glasses of wine per week     Comment: WINE 2 to 3 times week    Drug use: Not Currently    Sexual activity: Yes     Partners: Female     Social Drivers  "of Health     Financial Resource Strain: Low Risk  (7/25/2024)    Overall Financial Resource Strain (CARDIA)     Difficulty of Paying Living Expenses: Not hard at all   Food Insecurity: No Food Insecurity (7/25/2024)    Hunger Vital Sign     Worried About Running Out of Food in the Last Year: Never true     Ran Out of Food in the Last Year: Never true   Transportation Needs: No Transportation Needs (7/25/2024)    TRANSPORTATION NEEDS     Transportation : No   Physical Activity: Sufficiently Active (7/25/2024)    Exercise Vital Sign     Days of Exercise per Week: 4 days     Minutes of Exercise per Session: 50 min   Stress: No Stress Concern Present (7/25/2024)    [employer] - Occupational Stress Questionnaire     Feeling of Stress : Not at all   Housing Stability: Low Risk  (7/25/2024)    Housing Stability Vital Sign     Unable to Pay for Housing in the Last Year: No     Homeless in the Last Year: No       Review of Systems  14 point ROS was negative    OBJECTIVE:     Vital Signs  Pulse: 80  Resp: 20  BP: 133/84  Pain Score:   7  Height: 5' 10" (177.8 cm)  Weight: 86.2 kg (190 lb 0.6 oz)  Body mass index is 27.27 kg/m².    Neurosurgery Physical Exam  Constitutional: He appears well-developed and well-nourished.      Eyes: Pupils are equal, round, and reactive to light.      Cardiovascular: Normal rate and regular rhythm.      Abdominal: Soft.      Psych/Behavior: He is alert. He is oriented to person, place, and time. He has a normal mood and affect.      Musculoskeletal: Gait is abnormal.        Neck: Range of motion is limited.        Back: Range of motion is limited.      Neurological:        DTRs: Bicep reflexes are 3+ on the right side and 3+ on the left side. Patellar reflexes are 2+ on the right side and 2+ on the left side.        Cranial nerves: Cranial nerve(s) II, III, IV, V, VI, VII, VIII, IX, X, XI and XII are intact.      5/5 in BUE/BLE except 4/5 in right DF/EHL  SILT   "   +connolly's bilaterally  +right mid lumbar pain to palpation    Diagnostic Results:  EMG: no radiculopathy of BLE  Reviewed    Scoli: no mismatch or imbalance  Flex/ex c: instability at C2/3 and 4/5  Flex/ex L: mild translational movement at L3/4.  No hardware failure.  MRI c: no high grade stenosis, mild right C6/7 foraminal stenosis.  STIR signal in left facets from C2-5, right STIR signal in C4/5.  MRI T: no high grade stenosis  MRI L: mild bilateral foraminal stenosis at L3/4.  No severe facet arthropathy.    ASSESSMENT/PLAN:     64 M with hx of prior L4-S1 fusion who presents for continued right sided mid back pain.  His imaging is described above in addition to his EMG.  There are no imaging findings to correlate with his current symptoms and his EMG was relatively normal.  I discussed further treatment options including SCS trial versus dry needling and or accupuncture.  Pt is not interested in pursuing these interventions.  Will plan to see him PRN.

## 2024-11-18 ENCOUNTER — HOSPITAL ENCOUNTER (OUTPATIENT)
Facility: HOSPITAL | Age: 64
Discharge: HOME | End: 2024-11-18
Attending: ANESTHESIOLOGY | Admitting: ANESTHESIOLOGY
Payer: OTHER GOVERNMENT

## 2024-11-18 DIAGNOSIS — G89.29 CHRONIC NECK PAIN: Primary | ICD-10-CM

## 2024-11-18 DIAGNOSIS — M54.2 CHRONIC NECK PAIN: Primary | ICD-10-CM

## 2024-11-18 PROCEDURE — 25000003 PHARM REV CODE 250: Performed by: ANESTHESIOLOGY

## 2024-11-18 PROCEDURE — 63600175 PHARM REV CODE 636 W HCPCS: Performed by: ANESTHESIOLOGY

## 2024-11-18 PROCEDURE — 62321 NJX INTERLAMINAR CRV/THRC: CPT | Mod: ,,, | Performed by: ANESTHESIOLOGY

## 2024-11-18 PROCEDURE — A4216 STERILE WATER/SALINE, 10 ML: HCPCS | Performed by: ANESTHESIOLOGY

## 2024-11-18 PROCEDURE — 62321 NJX INTERLAMINAR CRV/THRC: CPT | Performed by: ANESTHESIOLOGY

## 2024-11-18 RX ORDER — DEXAMETHASONE SODIUM PHOSPHATE 10 MG/ML
INJECTION INTRAMUSCULAR; INTRAVENOUS
Status: DISCONTINUED
Start: 2024-11-18 | End: 2024-11-18 | Stop reason: HOSPADM

## 2024-11-18 RX ORDER — SODIUM CHLORIDE 0.9 % (FLUSH) 0.9 %
SYRINGE (ML) INJECTION
Status: DISCONTINUED | OUTPATIENT
Start: 2024-11-18 | End: 2024-11-18 | Stop reason: HOSPADM

## 2024-11-18 RX ORDER — LIDOCAINE HYDROCHLORIDE 10 MG/ML
INJECTION, SOLUTION EPIDURAL; INFILTRATION; INTRACAUDAL; PERINEURAL
Status: DISCONTINUED | OUTPATIENT
Start: 2024-11-18 | End: 2024-11-18 | Stop reason: HOSPADM

## 2024-11-18 RX ORDER — DEXAMETHASONE SODIUM PHOSPHATE 10 MG/ML
INJECTION INTRAMUSCULAR; INTRAVENOUS
Status: DISCONTINUED | OUTPATIENT
Start: 2024-11-18 | End: 2024-11-18 | Stop reason: HOSPADM

## 2024-11-18 RX ORDER — LIDOCAINE HYDROCHLORIDE 10 MG/ML
INJECTION, SOLUTION EPIDURAL; INFILTRATION; INTRACAUDAL; PERINEURAL
Status: DISCONTINUED
Start: 2024-11-18 | End: 2024-11-18 | Stop reason: HOSPADM

## 2024-11-18 NOTE — OP NOTE
Cervical Epidural Steroid Injection (C7-T1)    Pre-Procedure Diagnoses:  1. Chronic pain syndrome  2. Cervicalgia  3. Cervical radiculopathy  4. Cervical disc displacement    Post-Procedure Diagnoses:  1. Chronic pain syndrome  2. Cervicalgia  3. Cervical radiculopathy  4. Cervical disc displacement    Anesthesia:  Local only    Estimated Blood Loss:  None    Complications:  None    Informed Consent:  The procedure, risks, benefits, and alternatives were discussed with the patient. There were no contraindications to the procedure. The patient expressed understanding and agreed to proceed. Fully informed written consent was obtained.     Description of the Procedure:  The patient was taken to the operating room. IV access was obtained prior to the start of the procedure. The patient was positioned prone on the fluoroscopy table. Continuous hemodynamic monitoring was initiated and continued throughout the duration of the procedure. The skin overlying the cervicothoracic spine was prepped with Chloraprep and draped into a sterile field. Fluoroscopy was used to identify the location of the C7-T1 interspace. Skin anesthesia was achieved using 3 mL of 1% lidocaine. An 18 gauge 3.5 inch Tuohy needle was slowly inserted and advanced under intermittent fluoroscopy and into the epidural space by loss of resistance to saline. Proper needle position was confirmed under AP, oblique, and lateral fluoroscopic views. Negative aspiration for blood or CSF was confirmed. 1 mL of Isovue contrast was injected. Fluoroscopic imaging revealed a clear spread of agent from C5 to C7. Then a combination of 10 mg of dexamethasone and 2 mL of preservative-free normal saline was easily injected. There was no pain on injection. The needle was removed intact and bleeding was nil. Sterile bandages were applied. The patient was taken to the recovery room for further observation in stable condition. The patient was then discharged home without any  complications.

## 2024-11-18 NOTE — DISCHARGE SUMMARY
[facility] Surgical - Periop Services  Discharge Note  Short Stay    Procedure(s) (LRB):  INJECTION, STEROID, SPINE, CERVICAL, EPIDURAL  // C7-T1  no sedation (N/A)      OUTCOME: Patient tolerated treatment/procedure well without complication and is now ready for discharge.    DISPOSITION: Home or Self Care    FINAL DIAGNOSIS:  <principal problem not specified>    FOLLOWUP: In clinic    DISCHARGE INSTRUCTIONS:  No discharge procedures on file.     TIME SPENT ON DISCHARGE: 5 minutes

## 2024-11-18 NOTE — CARE UPDATE
Received patient from the OR, he is awake, solomon, denied c/o, exchanging well, callbell at bedside.

## 2024-11-18 NOTE — H&P
Autumn Buckley MD          PATIENT NAME: Jaxson Ferrell  : 1960    MRN: [id number]       Billing Provider: Autumn Buckley MD  Level of Service:   Patient PCP Information         Provider PCP Type     Anshul East MD General                Reason for Visit / Chief Complaint: Back Pain (Pt having back & neck pain would like to address neck pain today C/O pain level 8, Taking Gabapentin & baclofen for pain, )         Update PCP   Update Chief Complaint             History of Present Illness / Problem Focused Workflow      Jaxson Ferrell presents to the clinic with Back Pain (Pt having back & neck pain would like to address neck pain today C/O pain level 8, Taking Gabapentin & baclofen for pain, )     This is a 64-year-old male who presents to clinic today complaining of neck pain.  He has had neck pain for many years.  The pain seems to be getting worse over the past couple of months, and particularly the past week.  He underwent some injections in his neck many years ago that had helped.  He would like to have another 1 done.        Back Pain  Associated symptoms include leg pain and numbness.   Low-back Pain  Associated symptoms include leg pain and numbness.   Mid-back Pain  Associated symptoms include leg pain and numbness.   Follow-up  Associated symptoms include numbness.         Review of Systems      Review of Systems   Musculoskeletal:  Positive for back pain and leg pain.   Neurological:  Positive for numbness.   All other systems reviewed and are negative.        Medical / Social / Family History           Past Medical History:   Diagnosis Date    Chronic pain      Kidney stones      Osteoporosis      Spinal stenosis       thoracic               Past Surgical History:   Procedure Laterality Date    APPENDECTOMY        COLONOSCOPY N/A 2022     [facility]    CYSTOSCOPY W/ LASER LITHOTRIPSY        EPIDURAL STEROID INJECTION INTO THORACIC SPINE N/A 2023     Procedure:  INJECTION, STEROID, SPINE, THORACIC, EPIDURAL T10-11 interlaminar;  Surgeon: Autumn Buckley MD;  Location: [facility] OR;  Service: Pain Management;  Laterality: N/A;  T10-11 interlaminar    INJECTION OF ANESTHETIC AGENT AROUND MEDIAL BRANCH NERVES INNERVATING CERVICAL FACET JOINT Right 11/16/2023     Procedure: BLOCK, NERVE, FACET JOINT, CERVICAL, MEDIAL BRANCH Right MBB T10-12;  Surgeon: Autumn Buckley MD;  Location: [facility] OR;  Service: Pain Management;  Laterality: Right;    INJECTION OF ANESTHETIC AGENT AROUND MEDIAL BRANCH NERVES INNERVATING CERVICAL FACET JOINT Right 03/18/2024     Procedure: BLOCK, NERVE, FACET JOINT, CERVICAL, MEDIAL BRANCH Right  T10-12 no sedation;  Surgeon: Autumn Buckley MD;  Location: [facility] OR;  Service: Pain Management;  Laterality: Right;  Right MBB T10-12 no sedation    INJECTION OF ANESTHETIC AGENT AROUND MEDIAL BRANCH NERVES INNERVATING LUMBAR FACET JOINT Right 02/01/2024     Procedure: BLOCK, NERVE, FACET JOINT, LUMBAR, MEDIAL BRANCH Right L1-2   Patient does not want sedation;  Surgeon: Autumn Buckley MD;  Location: [facility] OR;  Service: Pain Management;  Laterality: Right;  Right MBB L1-2    INJECTION OF ANESTHETIC AGENT AROUND MEDIAL BRANCH NERVES INNERVATING LUMBAR FACET JOINT Right 04/11/2024     Procedure: BLOCK, NERVE, FACET JOINT, LUMBAR, MEDIAL BRANCH Right L1 L3  no ansesthesia;  Surgeon: Autumn Buckley MD;  Location: [facility] OR;  Service: Pain Management;  Laterality: Right;  MBB Rt L1-L3 *No Anesthesia*    MAGNETIC RESONANCE IMAGING N/A 8/2/2024     Procedure: MRI (Magnetic Resonance Imagine);  Surgeon: Pricila Meeks MD;  Location: [facility] OR;  Service: Anesthesiology;  Laterality: N/A;  MRI LUMBAR, THORACIC, CERVICAL SPINE W/OUT CONTRAST WITH ANESTHESIA    RADIOFREQUENCY ABLATION Right 05/06/2024     Procedure: RADIOFREQUENCY ABLATION Right L1-2;  Surgeon: Autumn Buckley MD;  Location: [facility] OR;  Service: Pain Management;  Laterality: Right;    spinal injections         SPINE SURGERY         fusion 2 levels with rods and screws, anterior approach    TONSILLECTOMY        turbinate reduction             Social History  Mr. Ferrell  reports that he quit smoking about 14 years ago. His smoking use included cigarettes. He has never used smokeless tobacco. He reports current alcohol use of about 3.0 standard drinks of alcohol per week. He reports that he does not currently use drugs.     Family History  Mr.'s Ferrell family history includes Breast cancer in his mother; Colon cancer in his father; Coronary artery disease in his father; Prostate cancer in his father.     Medications and Allergies      Medications         Outpatient Medications Marked as Taking for the 10/18/24 encounter (Office Visit) with Autumn Buckley MD   Medication Sig Dispense Refill    acetaminophen (TYLENOL) 650 MG TbSR Take 650 mg by mouth 2 (two) times daily.        atorvastatin (LIPITOR) 40 MG tablet Take 40 mg by mouth once daily.        baclofen (LIORESAL) 10 MG tablet Take 10 mg by mouth 2 (two) times daily.        co-enzyme Q-10 30 mg capsule Take 30 mg by mouth once daily.        gabapentin (NEURONTIN) 800 MG tablet Take 800 mg by mouth 3 (three) times daily.        hyoscyamine 0.125 mg Subl Place 0.125 mg under the tongue every 6 (six) hours as needed.        loratadine-pseudoephedrine 5-120 mg (CLARITIN-D 12 HOUR) 5-120 mg per tablet 1 tablet as needed Orally once a day        nabumetone (RELAFEN) 750 MG tablet 1 tablet Orally Twice a day        phenazopyridine (PYRIDIUM) 200 MG tablet Take 200 mg by mouth every 8 (eight) hours as needed.        PROLIA 60 mg/mL Syrg Inject 60 mg into the skin every 6 (six) months.        vitamin D (VITAMIN D3) 1000 units Tab Take 5,000 Units by mouth 3 (three) times a week.             Allergies  Review of patient's allergies indicates:  No Known Allergies     Physical Examination          Vitals:     10/18/24 1105   BP: 135/82   Pulse: 85      Spine  Musculoskeletal Exam     Gait    Gait is normal.     Inspection    Cervical Spine    Cervical spine inspection is normal.    Thoracolumbar        Prior incision: lateral lumbar    Incision: clean, dry, intact and well-healed    Incisional drainage: none     Palpation    Cervical Spine    Tenderness: present      Paraspinous: right and left      Trapezius: right and left      Periscapular: right and left    Right      Masses: none      Spasms: none      Crepitus: none      Muscle tone: normal    Left      Masses: none      Spasms: none      Crepitus: none      Muscle tone: normal    Thoracolumbar    Tenderness: present      Paraspinous: right    Right      Masses: none      Spasms: none      Crepitus: none      Muscle tone: increased    Left      Masses: none      Spasms: none      Crepitus: none      Muscle tone: normal     Range of Motion    Cervical Spine        Cervical spine range of motion additional comments: Restricted range of motion in the cervical spine due to pain.    Thoracolumbar        Thoracolumbar range of motion additional comments: Restricted range of motion in the lumbar spine secondary to pain.  Positive facet loading.     Strength    Cervical Spine    Cervical spine motor exam is normal.    Thoracolumbar    Thoracolumbar motor exam is normal.        Sensory    Cervical Spine    Cervical spine sensation is normal.    Thoracolumbar    Thoracolumbar sensation is normal.     General      Constitutional: appears stated age, well-developed and well-nourished    Scleral icterus: no    Labored breathing: no    Psychiatric: normal mood and affect and no acute distress    Neurological: alert and oriented x3    Skin: intact    Lymphadenopathy: none  CV: extremities warm, well-perfused  Resp: nonlabored breathing        Assessment and Plan (including Health Maintenance)       Problem List   Smart Sets   Document Outside HM   :     Plan:   Chronic neck pain     Cervical radiculitis     DDD (degenerative disc  disease), cervical        He is being scheduled for a cervical epidural steroid injection today to help with his chronic neck pain radiating into the bilateral shoulders and upper extremities.  The plan was discussed with the patient and he wishes to proceed.

## 2024-11-19 VITALS
BODY MASS INDEX: 27.67 KG/M2 | OXYGEN SATURATION: 99 % | DIASTOLIC BLOOD PRESSURE: 77 MMHG | HEART RATE: 84 BPM | RESPIRATION RATE: 18 BRPM | WEIGHT: 193.31 LBS | SYSTOLIC BLOOD PRESSURE: 148 MMHG | HEIGHT: 70 IN | TEMPERATURE: 98 F

## 2025-03-31 ENCOUNTER — TELEPHONE (OUTPATIENT)
Dept: INTERNAL MEDICINE | Facility: CLINIC | Age: 65
End: 2025-03-31
Payer: OTHER GOVERNMENT

## 2025-03-31 NOTE — TELEPHONE ENCOUNTER
He stated labs were just done with Dr. Mckeon office. I advised him that it will be sufficient for his appointment.

## 2025-03-31 NOTE — TELEPHONE ENCOUNTER
----- Message from Shavon sent at 3/31/2025 10:15 AM CDT -----  Can you please call Paula Jaxson regarding blood work for May? I rescheduled  his wellness mayra.  for June 6th and he wanted to know if he was able to use the same blood work from speciality doctor. He stated that it was done last year. Can you please follow up with him when free? Thank you so much

## 2025-05-06 ENCOUNTER — DOCUMENTATION ONLY (OUTPATIENT)
Dept: INTERNAL MEDICINE | Facility: CLINIC | Age: 65
End: 2025-05-06

## 2025-05-06 ENCOUNTER — TELEPHONE (OUTPATIENT)
Dept: INTERNAL MEDICINE | Facility: CLINIC | Age: 65
End: 2025-05-06

## 2025-05-06 NOTE — TELEPHONE ENCOUNTER
Copied from CRM #[id number]. Topic: General Inquiry - Patient Advice  >> May 6, 2025  8:14 AM Charlotte wrote:  Who Called: Jaxson Ferrell    Caller is requesting assistance/information from provider's office.    Symptoms (please be specific): n/a   How long has patient had these symptoms:  n/a  List of preferred pharmacies on file (remove unneeded): n/a      Preferred Method of Contact: Phone Call  Patient's Preferred Phone Number on File: [phone]   Best Call Back Number, if different:  Additional Information: Pt would like to know if blood work was received from Dr. Mckeon office.

## 2025-05-06 NOTE — TELEPHONE ENCOUNTER
Patient called to see if we had gotten records from Dr. Nunez. Upon checking chart I noticed that we had no labs from a Dr. Nunez. I called the patient to inquire which Dr. Nunez he was seeing. He stated that he was seeing Dr. Leanne Nunez under Dr. Maxi Nunez, I advised him that we had not gotten labs but I will request them. Called Dr. Mckeon office and Leanne nunez is a NP, requested records from her.

## 2025-06-06 ENCOUNTER — PATIENT MESSAGE (OUTPATIENT)
Dept: INTERNAL MEDICINE | Facility: CLINIC | Age: 65
End: 2025-06-06

## 2025-06-06 ENCOUNTER — OFFICE VISIT (OUTPATIENT)
Dept: INTERNAL MEDICINE | Facility: CLINIC | Age: 65
End: 2025-06-06
Payer: COMMERCIAL

## 2025-06-06 VITALS
RESPIRATION RATE: 18 BRPM | HEART RATE: 81 BPM | BODY MASS INDEX: 29.33 KG/M2 | DIASTOLIC BLOOD PRESSURE: 82 MMHG | SYSTOLIC BLOOD PRESSURE: 116 MMHG | HEIGHT: 69 IN | WEIGHT: 198 LBS | OXYGEN SATURATION: 98 %

## 2025-06-06 DIAGNOSIS — Z00.00 WELLNESS EXAMINATION: Primary | ICD-10-CM

## 2025-06-06 DIAGNOSIS — G89.29 CHRONIC BACK PAIN GREATER THAN 3 MONTHS DURATION: ICD-10-CM

## 2025-06-06 DIAGNOSIS — E55.9 VITAMIN D DEFICIENCY: ICD-10-CM

## 2025-06-06 DIAGNOSIS — E78.2 HYPERLIPIDEMIA, MIXED: Chronic | ICD-10-CM

## 2025-06-06 DIAGNOSIS — M54.9 CHRONIC BACK PAIN GREATER THAN 3 MONTHS DURATION: ICD-10-CM

## 2025-06-09 NOTE — TELEPHONE ENCOUNTER
Interesting article   Please ask him if he has ever tried a IF stimulation therapy for home use with a I asked stimulator that he can use daily

## 2025-06-09 NOTE — TELEPHONE ENCOUNTER
I asked him during the office visit, he states that he has tried a TINS unit, dry needling, and massages. Please advise

## 2025-06-09 NOTE — TELEPHONE ENCOUNTER
I do not have many other options is he has tried everything possible   If he has not tried magnesium asked him to try magnesium oxide 500 mg twice a day long-term

## (undated) DEVICE — SET SMARTSITE EXT SMALLBORE NF

## (undated) DEVICE — NDL HYPO REG 25G X 1 1/2

## (undated) DEVICE — ADAPTER DUAL NSL LUER M-M 7FT

## (undated) DEVICE — DRAPE MEDIUM SHEET 40X70IN

## (undated) DEVICE — SYR 3ML LL 18GA 1.5IN

## (undated) DEVICE — CHLORAPREP 10.5 ML APPLICATOR

## (undated) DEVICE — DRAPE UTILITY W/ TAPE 20X30IN

## (undated) DEVICE — NDL SYR 10ML 18X1.5 LL BLUNT

## (undated) DEVICE — NDL EPIDURAL TOUHY 18G X3.5

## (undated) DEVICE — SYR W NDL BLN FILL 5ML 18GX1.5

## (undated) DEVICE — CANNULA AIRLIFE ETCO2 NSL 7FT

## (undated) DEVICE — TOWEL OR DISP STRL BLUE 4/PK

## (undated) DEVICE — GLOVE SENSICARE PI SURG 6.5

## (undated) DEVICE — GLOVE PROTEXIS LTX MICRO 6.5

## (undated) DEVICE — CANNULA VENOM RF 18GX100MM

## (undated) DEVICE — Device

## (undated) DEVICE — SYR LUER-LOCK STERILE 5ML

## (undated) DEVICE — GLOVE SIGNATURE MICRO LTX 6.5

## (undated) DEVICE — COVER TABLE HVY DTY 60X90IN

## (undated) DEVICE — CONTRAST ISOVUE M 200 20ML VIL

## (undated) DEVICE — NDL SPINAL 22GA 3.5 IN QUINCKE

## (undated) DEVICE — GLOVE PROTEXIS PI SYN SURG 6.5

## (undated) DEVICE — NDL HYPO STD REG BVL 25GX1.5IN

## (undated) DEVICE — SYR EPILOR LUER-LOK LOR 7ML

## (undated) DEVICE — NDL QUINCKE SPINAL 25G 3.5IN

## (undated) DEVICE — APPLICATOR CHLORAPREP ORN 26ML

## (undated) DEVICE — DRAPE FULL SHEET 70X100IN